# Patient Record
Sex: FEMALE | Race: WHITE | NOT HISPANIC OR LATINO | Employment: FULL TIME | URBAN - METROPOLITAN AREA
[De-identification: names, ages, dates, MRNs, and addresses within clinical notes are randomized per-mention and may not be internally consistent; named-entity substitution may affect disease eponyms.]

---
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
## 2021-05-16 VITALS — WEIGHT: 131 LBS | HEART RATE: 86 BPM | OXYGEN SATURATION: 99 % | BODY MASS INDEX: 19.4 KG/M2 | HEIGHT: 69 IN

## 2021-05-16 VITALS — OXYGEN SATURATION: 99 % | BODY MASS INDEX: 19.4 KG/M2 | WEIGHT: 131 LBS | HEART RATE: 94 BPM | HEIGHT: 69 IN

## 2021-05-16 VITALS — BODY MASS INDEX: 18.51 KG/M2 | OXYGEN SATURATION: 100 % | HEIGHT: 69 IN | HEART RATE: 67 BPM | WEIGHT: 125 LBS

## 2021-05-16 VITALS — HEIGHT: 69 IN | BODY MASS INDEX: 18.51 KG/M2 | OXYGEN SATURATION: 99 % | WEIGHT: 125 LBS | HEART RATE: 67 BPM

## 2021-11-30 PROCEDURE — 87510 GARDNER VAG DNA DIR PROBE: CPT | Performed by: PHYSICIAN ASSISTANT

## 2021-11-30 PROCEDURE — 87660 TRICHOMONAS VAGIN DIR PROBE: CPT | Performed by: PHYSICIAN ASSISTANT

## 2021-11-30 PROCEDURE — 87491 CHLMYD TRACH DNA AMP PROBE: CPT | Performed by: PHYSICIAN ASSISTANT

## 2021-11-30 PROCEDURE — 87591 N.GONORRHOEAE DNA AMP PROB: CPT | Performed by: PHYSICIAN ASSISTANT

## 2021-11-30 PROCEDURE — 87480 CANDIDA DNA DIR PROBE: CPT | Performed by: PHYSICIAN ASSISTANT

## 2021-12-01 ENCOUNTER — TELEPHONE (OUTPATIENT)
Dept: URGENT CARE | Facility: CLINIC | Age: 21
End: 2021-12-01

## 2022-05-02 RX ORDER — LEVONORGESTREL AND ETHINYL ESTRADIOL 6-5-10
KIT ORAL
Qty: 84 TABLET | Refills: 0 | Status: SHIPPED | OUTPATIENT
Start: 2022-05-02

## 2022-11-07 RX ORDER — LEVONORGESTREL AND ETHINYL ESTRADIOL 6-5-10
KIT ORAL
Qty: 84 TABLET | Refills: 0 | OUTPATIENT
Start: 2022-11-07

## 2024-02-15 ENCOUNTER — LAB (OUTPATIENT)
Dept: LAB | Facility: HOSPITAL | Age: 24
End: 2024-02-15
Payer: COMMERCIAL

## 2024-02-15 ENCOUNTER — OFFICE VISIT (OUTPATIENT)
Dept: FAMILY MEDICINE CLINIC | Facility: CLINIC | Age: 24
End: 2024-02-15
Payer: COMMERCIAL

## 2024-02-15 VITALS
SYSTOLIC BLOOD PRESSURE: 125 MMHG | WEIGHT: 141.6 LBS | OXYGEN SATURATION: 100 % | BODY MASS INDEX: 20.97 KG/M2 | DIASTOLIC BLOOD PRESSURE: 80 MMHG | HEIGHT: 69 IN | HEART RATE: 95 BPM | TEMPERATURE: 98.5 F

## 2024-02-15 DIAGNOSIS — R53.83 FATIGUE, UNSPECIFIED TYPE: ICD-10-CM

## 2024-02-15 DIAGNOSIS — F32.A ANXIETY AND DEPRESSION: ICD-10-CM

## 2024-02-15 DIAGNOSIS — F41.9 ANXIETY AND DEPRESSION: ICD-10-CM

## 2024-02-15 DIAGNOSIS — R53.83 FATIGUE, UNSPECIFIED TYPE: Primary | ICD-10-CM

## 2024-02-15 DIAGNOSIS — G47.00 INSOMNIA, UNSPECIFIED TYPE: ICD-10-CM

## 2024-02-15 LAB
ALBUMIN SERPL-MCNC: 5.4 G/DL (ref 3.5–5.2)
ALBUMIN/GLOB SERPL: 2.3 G/DL
ALP SERPL-CCNC: 44 U/L (ref 39–117)
ALT SERPL W P-5'-P-CCNC: 6 U/L (ref 1–33)
ANION GAP SERPL CALCULATED.3IONS-SCNC: 11 MMOL/L (ref 5–15)
AST SERPL-CCNC: 10 U/L (ref 1–32)
BASOPHILS # BLD AUTO: 0.02 10*3/MM3 (ref 0–0.2)
BASOPHILS NFR BLD AUTO: 0.5 % (ref 0–1.5)
BILIRUB SERPL-MCNC: 0.3 MG/DL (ref 0–1.2)
BUN SERPL-MCNC: 14 MG/DL (ref 6–20)
BUN/CREAT SERPL: 16.3 (ref 7–25)
CALCIUM SPEC-SCNC: 10.4 MG/DL (ref 8.6–10.5)
CHLORIDE SERPL-SCNC: 106 MMOL/L (ref 98–107)
CO2 SERPL-SCNC: 24 MMOL/L (ref 22–29)
CREAT SERPL-MCNC: 0.86 MG/DL (ref 0.57–1)
DEPRECATED RDW RBC AUTO: 38.2 FL (ref 37–54)
EGFRCR SERPLBLD CKD-EPI 2021: 97.5 ML/MIN/1.73
EOSINOPHIL # BLD AUTO: 0.06 10*3/MM3 (ref 0–0.4)
EOSINOPHIL NFR BLD AUTO: 1.4 % (ref 0.3–6.2)
ERYTHROCYTE [DISTWIDTH] IN BLOOD BY AUTOMATED COUNT: 12.2 % (ref 12.3–15.4)
FERRITIN SERPL-MCNC: 39.3 NG/ML (ref 13–150)
FOLATE SERPL-MCNC: 14.4 NG/ML (ref 4.78–24.2)
GLOBULIN UR ELPH-MCNC: 2.3 GM/DL
GLUCOSE SERPL-MCNC: 82 MG/DL (ref 65–99)
HCT VFR BLD AUTO: 36.4 % (ref 34–46.6)
HGB BLD-MCNC: 12.2 G/DL (ref 12–15.9)
IMM GRANULOCYTES # BLD AUTO: 0.01 10*3/MM3 (ref 0–0.05)
IMM GRANULOCYTES NFR BLD AUTO: 0.2 % (ref 0–0.5)
IRON 24H UR-MRATE: 76 MCG/DL (ref 37–145)
IRON SATN MFR SERPL: 16 % (ref 20–50)
LYMPHOCYTES # BLD AUTO: 1.03 10*3/MM3 (ref 0.7–3.1)
LYMPHOCYTES NFR BLD AUTO: 23.9 % (ref 19.6–45.3)
MCH RBC QN AUTO: 29.3 PG (ref 26.6–33)
MCHC RBC AUTO-ENTMCNC: 33.5 G/DL (ref 31.5–35.7)
MCV RBC AUTO: 87.3 FL (ref 79–97)
MONOCYTES # BLD AUTO: 0.45 10*3/MM3 (ref 0.1–0.9)
MONOCYTES NFR BLD AUTO: 10.4 % (ref 5–12)
NEUTROPHILS NFR BLD AUTO: 2.74 10*3/MM3 (ref 1.7–7)
NEUTROPHILS NFR BLD AUTO: 63.6 % (ref 42.7–76)
NRBC BLD AUTO-RTO: 0 /100 WBC (ref 0–0.2)
PLATELET # BLD AUTO: 234 10*3/MM3 (ref 140–450)
PMV BLD AUTO: 10.2 FL (ref 6–12)
POTASSIUM SERPL-SCNC: 4.5 MMOL/L (ref 3.5–5.2)
PROT SERPL-MCNC: 7.7 G/DL (ref 6–8.5)
RBC # BLD AUTO: 4.17 10*6/MM3 (ref 3.77–5.28)
SODIUM SERPL-SCNC: 141 MMOL/L (ref 136–145)
T4 FREE SERPL-MCNC: 1.18 NG/DL (ref 0.93–1.7)
TIBC SERPL-MCNC: 466 MCG/DL (ref 298–536)
TRANSFERRIN SERPL-MCNC: 313 MG/DL (ref 200–360)
TSH SERPL DL<=0.05 MIU/L-ACNC: 3.22 UIU/ML (ref 0.27–4.2)
VIT B12 BLD-MCNC: 1359 PG/ML (ref 211–946)
WBC NRBC COR # BLD AUTO: 4.31 10*3/MM3 (ref 3.4–10.8)

## 2024-02-15 PROCEDURE — 86644 CMV ANTIBODY: CPT

## 2024-02-15 PROCEDURE — 86665 EPSTEIN-BARR CAPSID VCA: CPT

## 2024-02-15 PROCEDURE — 84439 ASSAY OF FREE THYROXINE: CPT

## 2024-02-15 PROCEDURE — 84466 ASSAY OF TRANSFERRIN: CPT

## 2024-02-15 PROCEDURE — 82607 VITAMIN B-12: CPT

## 2024-02-15 PROCEDURE — 99204 OFFICE O/P NEW MOD 45 MIN: CPT | Performed by: NURSE PRACTITIONER

## 2024-02-15 PROCEDURE — 36415 COLL VENOUS BLD VENIPUNCTURE: CPT

## 2024-02-15 PROCEDURE — 83540 ASSAY OF IRON: CPT

## 2024-02-15 PROCEDURE — 82728 ASSAY OF FERRITIN: CPT

## 2024-02-15 PROCEDURE — 80050 GENERAL HEALTH PANEL: CPT

## 2024-02-15 PROCEDURE — 86664 EPSTEIN-BARR NUCLEAR ANTIGEN: CPT

## 2024-02-15 PROCEDURE — 82746 ASSAY OF FOLIC ACID SERUM: CPT

## 2024-02-15 PROCEDURE — 86645 CMV ANTIBODY IGM: CPT

## 2024-02-15 RX ORDER — CHOLECALCIFEROL (VITAMIN D3) 125 MCG
500 CAPSULE ORAL DAILY
COMMUNITY

## 2024-02-15 RX ORDER — HYDROXYZINE HYDROCHLORIDE 25 MG/1
25 TABLET, FILM COATED ORAL NIGHTLY PRN
Qty: 30 TABLET | Refills: 1 | Status: SHIPPED | OUTPATIENT
Start: 2024-02-15

## 2024-02-15 RX ORDER — ESCITALOPRAM OXALATE 10 MG/1
10 TABLET ORAL DAILY
Qty: 30 TABLET | Refills: 1 | Status: SHIPPED | OUTPATIENT
Start: 2024-02-15

## 2024-02-17 LAB
CMV IGG SERPL IA-ACNC: 6.3 U/ML (ref 0–0.59)
CMV IGM SERPL IA-ACNC: <30 AU/ML (ref 0–29.9)

## 2024-02-19 LAB
EBV NA IGG SER IA-ACNC: >600 U/ML (ref 0–17.9)
EBV VCA IGG SER IA-ACNC: 385 U/ML (ref 0–17.9)
EBV VCA IGM SER IA-ACNC: <36 U/ML (ref 0–35.9)
SERVICE CMNT-IMP: ABNORMAL

## 2024-03-05 ENCOUNTER — TELEPHONE (OUTPATIENT)
Dept: FAMILY MEDICINE CLINIC | Facility: CLINIC | Age: 24
End: 2024-03-05
Payer: COMMERCIAL

## 2024-03-05 NOTE — TELEPHONE ENCOUNTER
Caller: Yuridia Nelson    Relationship: Self    Best call back number: [phone]     What is the best time to reach you: ANY    Who are you requesting to speak with (clinical staff, provider,  specific staff member): CLINICAL STAFF    What was the call regarding: PATIENT CALLED STATING THAT SHE IS CURRENTLY ONLY TAKING HER LEXAPRO. SHE IS GOING TO A PARTY THIS WEEKEND AND WANTED TO KNOW IF IT IS OKAY FOR HER TO DRINK ALCOHOL OR IF SHE SHOULD BE AWARE OF ANY SIDE EFFECTS

## 2024-03-06 NOTE — TELEPHONE ENCOUNTER
Left detailed message, OK FOR HUB TO RELAY if pt returns call.     Ok to consume alcohol while on Lexapro, would recommend taking it slow maybe only have 1-2 drinks if its the first time drinking with it.

## 2024-04-11 DIAGNOSIS — F41.9 ANXIETY AND DEPRESSION: ICD-10-CM

## 2024-04-11 DIAGNOSIS — F32.A ANXIETY AND DEPRESSION: ICD-10-CM

## 2024-04-11 RX ORDER — ESCITALOPRAM OXALATE 10 MG/1
10 TABLET ORAL DAILY
Qty: 30 TABLET | Refills: 0 | Status: SHIPPED | OUTPATIENT
Start: 2024-04-11

## 2024-04-12 NOTE — TELEPHONE ENCOUNTER
HUB TO RELAY & SCHEDULE                         LMTCB to schedule appt for further refills.

## 2024-04-16 NOTE — TELEPHONE ENCOUNTER
HUB TO RELAY & SCHEDULE                         2nd attempt to contact, PHONE NUMBER IS NO LONGER IN SERVICE. PATIENT DOES NOT HAVE MY CHART. WILL SEND LETTER.

## 2024-04-17 NOTE — TELEPHONE ENCOUNTER
HUB TO RELAY & SCHEDULE                         3rd attempt to contact, PHONE NUMBER IS NO LONGER IN SERVICE. PATIENT DOES NOT HAVE MY CHART. SEND LETTER on 04/16/2024.

## 2025-02-07 ENCOUNTER — TELEPHONE (OUTPATIENT)
Dept: OBSTETRICS AND GYNECOLOGY | Facility: CLINIC | Age: 25
End: 2025-02-07
Payer: COMMERCIAL

## 2025-02-07 NOTE — TELEPHONE ENCOUNTER
Caller: Yuridia Nelson    Relationship to patient: Self    Best call back number: [phone] (home)       Patient is needing: A CALL BACK- SHE IS A NEW OB PT AND HAD SOME SPOTTING SINCE VAGINAL US DONE 3 DAYS AGO. ULTRASOUND WAS DONE SOME WHERE ELSE. THE SPOTTING IS BROWN IN COLOR

## 2025-02-07 NOTE — TELEPHONE ENCOUNTER
Patient called and advised spotting can be common after a vaginal exam/ultrasound or intercourse. She states it is just brownish at this time. Advised to monitor and if increasing in amount, becoming more red in color, or having cramping to go to the ER for evaluation. Recommended pelvic rest until gone for at least 24-48 hours.

## 2025-02-11 ENCOUNTER — APPOINTMENT (OUTPATIENT)
Dept: ULTRASOUND IMAGING | Facility: HOSPITAL | Age: 25
End: 2025-02-11
Payer: COMMERCIAL

## 2025-02-11 ENCOUNTER — HOSPITAL ENCOUNTER (EMERGENCY)
Facility: HOSPITAL | Age: 25
Discharge: HOME | End: 2025-02-11
Attending: EMERGENCY MEDICINE | Admitting: EMERGENCY MEDICINE
Payer: COMMERCIAL

## 2025-02-11 VITALS
TEMPERATURE: 98.3 F | HEIGHT: 69 IN | SYSTOLIC BLOOD PRESSURE: 111 MMHG | WEIGHT: 154.32 LBS | RESPIRATION RATE: 18 BRPM | OXYGEN SATURATION: 99 % | BODY MASS INDEX: 22.86 KG/M2 | DIASTOLIC BLOOD PRESSURE: 66 MMHG | HEART RATE: 88 BPM

## 2025-02-11 DIAGNOSIS — D64.9 ANEMIA, UNSPECIFIED TYPE: ICD-10-CM

## 2025-02-11 DIAGNOSIS — O03.9 COMPLETE MISCARRIAGE: Primary | ICD-10-CM

## 2025-02-11 LAB
ABO GROUP BLD: NORMAL
BASOPHILS # BLD AUTO: 0.02 10*3/MM3 (ref 0–0.2)
BASOPHILS # BLD AUTO: 0.05 10*3/MM3 (ref 0–0.2)
BASOPHILS NFR BLD AUTO: 0.2 % (ref 0–1.5)
BASOPHILS NFR BLD AUTO: 0.3 % (ref 0–1.5)
DEPRECATED RDW RBC AUTO: 39.5 FL (ref 37–54)
DEPRECATED RDW RBC AUTO: 40.3 FL (ref 37–54)
EOSINOPHIL # BLD AUTO: 0.01 10*3/MM3 (ref 0–0.4)
EOSINOPHIL # BLD AUTO: 0.03 10*3/MM3 (ref 0–0.4)
EOSINOPHIL NFR BLD AUTO: 0.1 % (ref 0.3–6.2)
EOSINOPHIL NFR BLD AUTO: 0.2 % (ref 0.3–6.2)
ERYTHROCYTE [DISTWIDTH] IN BLOOD BY AUTOMATED COUNT: 12.2 % (ref 12.3–15.4)
ERYTHROCYTE [DISTWIDTH] IN BLOOD BY AUTOMATED COUNT: 12.4 % (ref 12.3–15.4)
HCG INTACT+B SERPL-ACNC: 2605 MIU/ML
HCT VFR BLD AUTO: 21.7 % (ref 34–46.6)
HCT VFR BLD AUTO: 31.1 % (ref 34–46.6)
HGB BLD-MCNC: 10.3 G/DL (ref 12–15.9)
HGB BLD-MCNC: 7.2 G/DL (ref 12–15.9)
HOLD SPECIMEN: NORMAL
HOLD SPECIMEN: NORMAL
IMM GRANULOCYTES # BLD AUTO: 0.04 10*3/MM3 (ref 0–0.05)
IMM GRANULOCYTES # BLD AUTO: 0.04 10*3/MM3 (ref 0–0.05)
IMM GRANULOCYTES NFR BLD AUTO: 0.3 % (ref 0–0.5)
IMM GRANULOCYTES NFR BLD AUTO: 0.4 % (ref 0–0.5)
LYMPHOCYTES # BLD AUTO: 0.68 10*3/MM3 (ref 0.7–3.1)
LYMPHOCYTES # BLD AUTO: 0.93 10*3/MM3 (ref 0.7–3.1)
LYMPHOCYTES NFR BLD AUTO: 6 % (ref 19.6–45.3)
LYMPHOCYTES NFR BLD AUTO: 6.9 % (ref 19.6–45.3)
MCH RBC QN AUTO: 29.1 PG (ref 26.6–33)
MCH RBC QN AUTO: 29.7 PG (ref 26.6–33)
MCHC RBC AUTO-ENTMCNC: 33.1 G/DL (ref 31.5–35.7)
MCHC RBC AUTO-ENTMCNC: 33.2 G/DL (ref 31.5–35.7)
MCV RBC AUTO: 87.9 FL (ref 79–97)
MCV RBC AUTO: 89.6 FL (ref 79–97)
MONOCYTES # BLD AUTO: 0.39 10*3/MM3 (ref 0.1–0.9)
MONOCYTES # BLD AUTO: 0.64 10*3/MM3 (ref 0.1–0.9)
MONOCYTES NFR BLD AUTO: 4 % (ref 5–12)
MONOCYTES NFR BLD AUTO: 4.1 % (ref 5–12)
NEUTROPHILS NFR BLD AUTO: 13.85 10*3/MM3 (ref 1.7–7)
NEUTROPHILS NFR BLD AUTO: 8.68 10*3/MM3 (ref 1.7–7)
NEUTROPHILS NFR BLD AUTO: 88.4 % (ref 42.7–76)
NEUTROPHILS NFR BLD AUTO: 89.1 % (ref 42.7–76)
NRBC BLD AUTO-RTO: 0 /100 WBC (ref 0–0.2)
NRBC BLD AUTO-RTO: 0 /100 WBC (ref 0–0.2)
PLATELET # BLD AUTO: 161 10*3/MM3 (ref 140–450)
PLATELET # BLD AUTO: 205 10*3/MM3 (ref 140–450)
PMV BLD AUTO: 10.1 FL (ref 6–12)
PMV BLD AUTO: 9.6 FL (ref 6–12)
RBC # BLD AUTO: 2.47 10*6/MM3 (ref 3.77–5.28)
RBC # BLD AUTO: 3.47 10*6/MM3 (ref 3.77–5.28)
RH BLD: POSITIVE
WBC NRBC COR # BLD AUTO: 15.54 10*3/MM3 (ref 3.4–10.8)
WBC NRBC COR # BLD AUTO: 9.82 10*3/MM3 (ref 3.4–10.8)
WHOLE BLOOD HOLD COAG: NORMAL
WHOLE BLOOD HOLD SPECIMEN: NORMAL

## 2025-02-11 PROCEDURE — 99284 EMERGENCY DEPT VISIT MOD MDM: CPT

## 2025-02-11 PROCEDURE — 25010000002 ONDANSETRON PER 1 MG: Performed by: EMERGENCY MEDICINE

## 2025-02-11 PROCEDURE — 25810000003 LACTATED RINGERS SOLUTION: Performed by: EMERGENCY MEDICINE

## 2025-02-11 PROCEDURE — 25010000002 FENTANYL CITRATE (PF) 50 MCG/ML SOLUTION: Performed by: EMERGENCY MEDICINE

## 2025-02-11 PROCEDURE — 96375 TX/PRO/DX INJ NEW DRUG ADDON: CPT

## 2025-02-11 PROCEDURE — 76817 TRANSVAGINAL US OBSTETRIC: CPT

## 2025-02-11 PROCEDURE — 86900 BLOOD TYPING SEROLOGIC ABO: CPT | Performed by: EMERGENCY MEDICINE

## 2025-02-11 PROCEDURE — 86901 BLOOD TYPING SEROLOGIC RH(D): CPT | Performed by: EMERGENCY MEDICINE

## 2025-02-11 PROCEDURE — 96374 THER/PROPH/DIAG INJ IV PUSH: CPT

## 2025-02-11 PROCEDURE — 84702 CHORIONIC GONADOTROPIN TEST: CPT | Performed by: EMERGENCY MEDICINE

## 2025-02-11 PROCEDURE — 36415 COLL VENOUS BLD VENIPUNCTURE: CPT | Performed by: EMERGENCY MEDICINE

## 2025-02-11 PROCEDURE — 85025 COMPLETE CBC W/AUTO DIFF WBC: CPT | Performed by: EMERGENCY MEDICINE

## 2025-02-11 RX ORDER — HYDROCODONE BITARTRATE AND ACETAMINOPHEN 7.5; 325 MG/1; MG/1
1 TABLET ORAL EVERY 6 HOURS PRN
Qty: 12 TABLET | Refills: 0 | Status: SHIPPED | OUTPATIENT
Start: 2025-02-11

## 2025-02-11 RX ORDER — FENTANYL CITRATE 50 UG/ML
50 INJECTION, SOLUTION INTRAMUSCULAR; INTRAVENOUS ONCE
Status: COMPLETED | OUTPATIENT
Start: 2025-02-11 | End: 2025-02-11

## 2025-02-11 RX ORDER — FERROUS SULFATE 325(65) MG
325 TABLET ORAL
Qty: 15 TABLET | Refills: 0 | Status: SHIPPED | OUTPATIENT
Start: 2025-02-11

## 2025-02-11 RX ORDER — ONDANSETRON 2 MG/ML
4 INJECTION INTRAMUSCULAR; INTRAVENOUS ONCE
Status: COMPLETED | OUTPATIENT
Start: 2025-02-11 | End: 2025-02-11

## 2025-02-11 RX ORDER — MISOPROSTOL 200 UG/1
600 TABLET ORAL ONCE
Status: COMPLETED | OUTPATIENT
Start: 2025-02-11 | End: 2025-02-11

## 2025-02-11 RX ORDER — SODIUM CHLORIDE 0.9 % (FLUSH) 0.9 %
10 SYRINGE (ML) INJECTION AS NEEDED
Status: DISCONTINUED | OUTPATIENT
Start: 2025-02-11 | End: 2025-02-11 | Stop reason: HOSPADM

## 2025-02-11 RX ADMIN — SODIUM CHLORIDE, POTASSIUM CHLORIDE, SODIUM LACTATE AND CALCIUM CHLORIDE 1000 ML: 600; 310; 30; 20 INJECTION, SOLUTION INTRAVENOUS at 12:21

## 2025-02-11 RX ADMIN — FENTANYL CITRATE 50 MCG: 50 INJECTION, SOLUTION INTRAMUSCULAR; INTRAVENOUS at 14:45

## 2025-02-11 RX ADMIN — MISOPROSTOL 600 MCG: 200 TABLET ORAL at 17:13

## 2025-02-11 RX ADMIN — SODIUM CHLORIDE, SODIUM LACTATE, POTASSIUM CHLORIDE, CALCIUM CHLORIDE 1000 ML: 20; 30; 600; 310 INJECTION, SOLUTION INTRAVENOUS at 14:49

## 2025-02-11 RX ADMIN — ONDANSETRON 4 MG: 2 INJECTION INTRAMUSCULAR; INTRAVENOUS at 14:45

## 2025-02-11 NOTE — Clinical Note
[facility] EMERGENCY ROOM  [address]  [place] [address]  Phone: [phone]  Fax: [phone]    Yuridia Nelson was seen and treated in our emergency department on 2/11/2025.  She may return to work on 02/15/2025.         Thank you for choosing [facility].    Nick Mayfield, DO

## 2025-02-11 NOTE — ED NOTES
Per ems, Patient is 8 weeks pregnant, started having heavy bleeding, passed tissue. 25mcg fentanyl given ems, LR also given. Patient was hypotensive. Patient still bleeding.

## 2025-02-11 NOTE — DISCHARGE INSTRUCTIONS
Drink plenty of fluids.  Take medication as directed.  Return for worsening symptoms such as heavy bleeding, high fever, severe pain, persistent vomiting, other severe symptoms.  Follow-up with your OB/GYN this week.

## 2025-02-11 NOTE — ED PROVIDER NOTES
Time: 11:58 AM EST  Date of encounter:  2025  Independent Historian/Clinical History and Information was obtained by:   Patient    History is limited by: N/A    Chief Complaint: Pregnancy and vaginal bleeding      History of Present Illness:  Patient is a 24 y.o. year old female who presents to the emergency department for evaluation of pregnancy and vaginal bleeding.  This patient states he is approximately 8 weeks pregnant and started having vaginal bleeding and pelvic pain which started earlier in the day.  She said no fever chills cough vomiting or diarrhea she did feel somewhat lightheaded.  The patient states that she is  1 para 0 and has been seen at a clinic in [place] and at [facility] here in [place].      Patient Care Team  Primary Care Provider: Provider, No Known    Past Medical History:     No Known Allergies  Past Medical History:   Diagnosis Date    Anxiety     Depression     MRSA (methicillin resistant staph aureus) culture positive      Past Surgical History:   Procedure Laterality Date    LEG ABSCESS INCISION AND DRAINAGE Right     leg- mrsa     History reviewed. No pertinent family history.    Home Medications:  Prior to Admission medications    Medication Sig Start Date End Date Taking? Authorizing Provider   escitalopram (LEXAPRO) 10 MG tablet TAKE 1 TABLET BY MOUTH EVERY DAY 24   Wendy Blanc APRN   hydrOXYzine (ATARAX) 25 MG tablet Take 1 tablet by mouth At Night As Needed (insomnia/anxiety). 2/15/24   Wendy Blanc APRN   vitamin B-12 (CYANOCOBALAMIN) 500 MCG tablet Take 1 tablet by mouth Daily.    Provider, Historical, MD        Social History:   Social History     Tobacco Use    Smoking status: Former     Current packs/day: 1.00     Average packs/day: 1 pack/day for 2.0 years (2.0 ttl pk-yrs)     Types: Cigarettes    Smokeless tobacco: Never   Vaping Use    Vaping status: Every Day    Substances: Nicotine   Substance Use Topics     "Alcohol use: Yes     Comment: social    Drug use: Never         Review of Systems:  Review of Systems   Constitutional:  Negative for chills and fever.   HENT:  Negative for congestion, ear pain and sore throat.    Eyes:  Negative for pain.   Respiratory:  Negative for cough, chest tightness and shortness of breath.    Cardiovascular:  Negative for chest pain.   Gastrointestinal:  Negative for abdominal pain, diarrhea, nausea and vomiting.   Genitourinary:  Positive for pelvic pain and vaginal bleeding. Negative for flank pain and hematuria.   Musculoskeletal:  Negative for joint swelling.   Skin:  Negative for pallor.   Neurological:  Negative for seizures and headaches.   All other systems reviewed and are negative.       Physical Exam:  /66 (BP Location: Left arm, Patient Position: Sitting)   Pulse 88   Temp 98.3 °F (36.8 °C) (Oral)   Resp 18   Ht 175.3 cm (69\")   Wt 70 kg (154 lb 5.2 oz)   SpO2 99%   BMI 22.79 kg/m²     Physical Exam  Vitals and nursing note reviewed.   Constitutional:       General: She is not in acute distress.     Appearance: Normal appearance. She is not toxic-appearing.   HENT:      Head: Normocephalic and atraumatic.      Mouth/Throat:      Mouth: Mucous membranes are moist.   Eyes:      General: No scleral icterus.  Cardiovascular:      Rate and Rhythm: Normal rate and regular rhythm.      Pulses: Normal pulses.      Heart sounds: Normal heart sounds.   Pulmonary:      Effort: Pulmonary effort is normal. No respiratory distress.      Breath sounds: Normal breath sounds.   Abdominal:      General: Abdomen is flat.      Palpations: Abdomen is soft.      Tenderness: There is no abdominal tenderness.   Genitourinary:     Comments: Vaginal bleeding with open cervix at 4 cm and bulging membrane  Musculoskeletal:         General: Normal range of motion.      Cervical back: Normal range of motion and neck supple.   Skin:     General: Skin is warm and dry.   Neurological:      Mental " Status: She is alert and oriented to person, place, and time. Mental status is at baseline.                    Medical Decision Making:      Comorbidities that affect care:    Pregnancy    External Notes reviewed:    Previous Clinic Note: office visit for fatigue      The following orders were placed and all results were independently analyzed by me:  Orders Placed This Encounter   Procedures    US Ob Transvaginal    Elmo Draw    hCG, Quantitative, Pregnancy    CBC Auto Differential    CBC Auto Differential    Undress & Gown    Vital Signs    Supplies To Bedside - Notify MD When Ready- Pelvic Cart / Set Up    OB / GYN (on-call MD unless specified)    ABO / Rh    CBC & Differential    Green Top (Gel)    Lavender Top    Gold Top - SST    Light Blue Top    CBC & Differential       Medications Given in the Emergency Department:  Medications   lactated ringers bolus 1,000 mL (0 mL Intravenous Stopped 2/11/25 1405)   fentaNYL citrate (PF) (SUBLIMAZE) injection 50 mcg (50 mcg Intravenous Given 2/11/25 1445)   lactated ringers bolus 1,000 mL (0 mL Intravenous Stopped 2/11/25 1739)   ondansetron (ZOFRAN) injection 4 mg (4 mg Intravenous Given 2/11/25 1445)   miSOPROStol (CYTOTEC) tablet 600 mcg (600 mcg Rectal Given 2/11/25 1713)        ED Course:         Labs:    Lab Results (last 24 hours)       Procedure Component Value Units Date/Time    CBC & Differential [[id number]]  (Abnormal) Collected: 02/11/25 1213    Specimen: Blood Updated: 02/11/25 1222    Narrative:      The following orders were created for panel order CBC & Differential.  Procedure                               Abnormality         Status                     ---------                               -----------         ------                     CBC Auto Differential[[id number]]        Abnormal            Final result                 Please view results for these tests on the individual orders.    hCG, Quantitative, Pregnancy [[id number]] Collected: 02/11/25  1213    Specimen: Blood Updated: 02/11/25 1239     HCG Quantitative 2,605.00 mIU/mL     Narrative:      HCG Ranges by Gestational Age    Females - non-pregnant premenopausal   </= 1mIU/mL HCG  Females - postmenopausal               </= 7mIU/mL HCG    3 Weeks       5.4   -      72 mIU/mL  4 Weeks      10.2   -     708 mIU/mL  5 Weeks       217   -   8,245 mIU/mL  6 Weeks       152   -  32,177 mIU/mL  7 Weeks     4,059   - 153,767 mIU/mL  8 Weeks    31,366   - 149,094 mIU/mL  9 Weeks    59,109   - 135,901 mIU/mL  10 Weeks   44,186   - 170,409 mIU/mL  12 Weeks   27,107   - 201,615 mIU/mL  14 Weeks   24,302   -  93,646 mIU/mL  15 Weeks   12,540   -  69,747 mIU/mL  16 Weeks    8,904   -  55,332 mIU/mL  17 Weeks    8,240   -  51,793 mIU/mL  18 Weeks    9,649   -  55,271 mIU/mL      CBC Auto Differential [[id number]]  (Abnormal) Collected: 02/11/25 1213    Specimen: Blood Updated: 02/11/25 1222     WBC 15.54 10*3/mm3      RBC 3.47 10*6/mm3      Hemoglobin 10.3 g/dL      Hematocrit 31.1 %      MCV 89.6 fL      MCH 29.7 pg      MCHC 33.1 g/dL      RDW 12.4 %      RDW-SD 40.3 fl      MPV 10.1 fL      Platelets 205 10*3/mm3      Neutrophil % 89.1 %      Lymphocyte % 6.0 %      Monocyte % 4.1 %      Eosinophil % 0.2 %      Basophil % 0.3 %      Immature Grans % 0.3 %      Neutrophils, Absolute 13.85 10*3/mm3      Lymphocytes, Absolute 0.93 10*3/mm3      Monocytes, Absolute 0.64 10*3/mm3      Eosinophils, Absolute 0.03 10*3/mm3      Basophils, Absolute 0.05 10*3/mm3      Immature Grans, Absolute 0.04 10*3/mm3      nRBC 0.0 /100 WBC     CBC & Differential [[id number]]  (Abnormal) Collected: 02/11/25 1649    Specimen: Blood from Arm, Left Updated: 02/11/25 1713    Narrative:      The following orders were created for panel order CBC & Differential.  Procedure                               Abnormality         Status                     ---------                               -----------         ------                     CBC Auto  Differential[[id number]]        Abnormal            Final result                 Please view results for these tests on the individual orders.    CBC Auto Differential [[id number]]  (Abnormal) Collected: 02/11/25 1649    Specimen: Blood from Arm, Left Updated: 02/11/25 1713     WBC 9.82 10*3/mm3      RBC 2.47 10*6/mm3      Hemoglobin 7.2 g/dL      Hematocrit 21.7 %      MCV 87.9 fL      MCH 29.1 pg      MCHC 33.2 g/dL      RDW 12.2 %      RDW-SD 39.5 fl      MPV 9.6 fL      Platelets 161 10*3/mm3      Neutrophil % 88.4 %      Lymphocyte % 6.9 %      Monocyte % 4.0 %      Eosinophil % 0.1 %      Basophil % 0.2 %      Immature Grans % 0.4 %      Neutrophils, Absolute 8.68 10*3/mm3      Lymphocytes, Absolute 0.68 10*3/mm3      Monocytes, Absolute 0.39 10*3/mm3      Eosinophils, Absolute 0.01 10*3/mm3      Basophils, Absolute 0.02 10*3/mm3      Immature Grans, Absolute 0.04 10*3/mm3      nRBC 0.0 /100 WBC              Imaging:    US Ob Transvaginal    Result Date: 2/11/2025  US OB TRANSVAGINAL Date of Exam: 2/11/2025 12:57 PM EST Indication: First pregnancy and vaginal bleeding. Comparison: CT abdomen and pelvis dated 10/8/2019 Technique: Transvaginal ultrasound was performed to evaluate pregnancy.  Real time scanning was performed with multiple image documentation per protocol.  Findings: The uterus is present and anteverted measuring 9.6 x 5.1 x 5.3 cm. The endometrial thickness appears normal measuring 4 mm. There is no intrauterine pregnancy. There is nonspecific soft tissue at the cervix which could represent active passage of failed products of conception. Cervical mass is felt less likely but not excluded given the clinical scenario. The right ovary measures 3.0 x 1.3 x 2.4 cm. The left ovary measures 3.2 x 3.9 x 1.5 cm. There is a probable corpus luteum cyst at the left ovary measuring 1.4 x 1.5 x 1.0 cm. There is an anechoic central portion. There is no free fluid within the pelvis.     Impression: 1. No evidence  of normal intrauterine pregnancy. Sonographer reports soft tissue and blood products at the cervix which could represent active passage of a failed pregnancy. Correlate clinically with physical examination and follow-up with beta hCG. OB consultation may be helpful. 2. Probable left-sided corpus luteum cyst. Electronically Signed: Jose Armando Fosterelor  2/11/2025 2:03 PM EST  Workstation ID: [id number]       Differential Diagnosis and Discussion:    Vaginal Bleeding: Differential diagnosis includes but is not limited to foreign body, tumor, vaginitis, dysfunctional uterine bleeding, endocrine abnormalities, coagulation disorder, systemic illness, polyps, complications of pregnancy (possible ectopic pregnancy).    PROCEDURES:    Labs were collected in the emergency department and all labs were reviewed and interpreted by me.  X-ray were performed in the emergency department and all X-ray impressions were independently interpreted by me.    No orders to display       Procedures    MDM     Amount and/or Complexity of Data Reviewed  Clinical lab tests: reviewed  Tests in the radiology section of CPT®: reviewed  Decide to obtain previous medical records or to obtain history from someone other than the patient: yes                       Patient Care Considerations:    CT ABDOMEN AND PELVIS: I considered ordering a CT scan of the abdomen and pelvis however US is better for pregnancy evaluation      Consultants/Shared Management Plan:    Consultant: I have discussed the case with DR Stokes who states he saw patient in the eD    Social Determinants of Health:    Patient has presented with family members who are responsible, reliable and will ensure follow up care.      Disposition and Care Coordination:    Discharged: I considered escalation of care by admitting this patient to the hospital, however The patient was seen and evaluated by Dr Stokes who removed blood and POC and patient is now no longer bleeding.  Drop in Hgb  discussed and the patient will be discharged home  on iron    I have explained discharge medications and the need for follow up with the patient/caretakers. This was also printed in the discharge instructions. Patient was discharged with the following medications and follow up:      Medication List        New Prescriptions      ferrous sulfate 325 (65 FE) MG tablet  Take 1 tablet by mouth Daily With Breakfast.     HYDROcodone-acetaminophen 7.5-325 MG per tablet  Commonly known as: NORCO  Take 1 tablet by mouth Every 6 (Six) Hours As Needed (Pain).               Where to Get Your Medications        These medications were sent to [facility], [place] - [place], [place] - [address] - [phone]  - [phone]   [address], [facility] [address]      Phone: [phone]   ferrous sulfate 325 (65 FE) MG tablet  HYDROcodone-acetaminophen 7.5-325 MG per tablet      Antolin Morris MD  [facility] [place] [address] [phone]    In 1 day  Call for appointment       Final diagnoses:   Complete miscarriage   Anemia, unspecified type        ED Disposition       ED Disposition   Discharge    Condition   Stable    Comment   --               This medical record created using voice recognition software.             Nick Mayfield,   02/11/25 6832

## 2025-02-11 NOTE — CONSULTS
GYN HISTORY & PHYSICAL      Patient Name: Yuridia Nelson  : 2000  MRN: [id number]    Subjective     Chief Complaint:   Chief Complaint   Patient presents with    Vaginal Bleeding - Pregnant       HPI:  24 y.o.  No LMP recorded. Patient is pregnant.  Who presented emergency department with complaint of heavy vaginal bleeding.  Patient reports that she started having some brown discharge yesterday.  Heavy bleeding started at 9:00 this morning.  Patient was diagnosed with a pregnancy in [place].  She had an ultrasound there and was told that she was 6 weeks along.  She is unaware of what was seen on the ultrasound.  She is unaware of she had a quantitative hCG done.  She was supposed to follow-up 2 weeks after the initial evaluation but has moved to this area.  She had an ultrasound at [facility] but again was not told what they saw.  She reports cramping in addition to the bleeding.  Dr. Mayfield did initial exam and saw some membranes at the cervical os.  Ultrasound was done here did not show any evidence of intrauterine pregnancy but there was some blood and tissue in the cervical os.  I was asked to evaluate patient.  She denies any fever or chills.  Social History     Substance and Sexual Activity   Sexual Activity Defer           ROS:     Personal History     PMHx:    Past Medical History:   Diagnosis Date    Anxiety     Depression     MRSA (methicillin resistant staph aureus) culture positive        OBHx:   OB History    Para Term  AB Living   1             SAB IAB Ectopic Molar Multiple Live Births                    # Outcome Date GA Lbr Mitchel/2nd Weight Sex Type Anes PTL Lv   1 Current                 Medications:  escitalopram, hydrOXYzine, and vitamin B-12    Allergies:  No Known Allergies    PSHx:   Past Surgical History:   Procedure Laterality Date    LEG ABSCESS INCISION AND DRAINAGE Right     leg- mrsa       Social History:    reports that she has quit smoking. Her smoking  use included cigarettes. She has a 2 pack-year smoking history. She has never used smokeless tobacco. She reports current alcohol use. She reports that she does not use drugs.    Family History:   No family history on file.    Immunizations: Non contributory to this admission        Objective     Vitals:   Temp:  [98.2 °F (36.8 °C)-98.5 °F (36.9 °C)] 98.5 °F (36.9 °C)  Heart Rate:  [62-81] 81  Resp:  [18] 18  BP: (89-96)/(52-67) 89/52    PHYSICAL EXAM:   General- NAD, alert and oriented, appropriate  Psych- Normal mood, good memory  Cardiovascular- Regular rhythm, no murnurs  Respiratory- CTA to bases, no wheezes  Abdomen- NABS, soft, non distended, non tender, no masses  Pelvic- Normal external female genitalia  Bladder: Non distended, non tender, no prolapse  Vulva/Vagina: Without lesion and without discharge  Sterile speculum exam: Large amount of blood and clots in the vaginal vault.  After clearing the blood and the clots there was tissue and membranes noted at the cervical os.  Using ring forcep the tissue was removed and the bleeding stopped.        Lab/Imaging/Other: Labs and ultrasound reviewed      Assessment / Plan     Assessment:  Incomplete miscarriage.  Bleeding subsided significantly after removal of the residual pregnancy tissue in the cervical os.    Plan:   Pain medication  Cytotec 600 mcg rectally  Patient is a positive so she does not need RhoGAM  Needs to schedule an appointment with OB/GYN for follow-up  No follow-ups on file.          Signature:     [facility] EMERGENCY ROOM  [address]  [place] [address]  Dept: [phone]  Dept Fax: [phone]  Loc: [phone]

## 2025-02-14 ENCOUNTER — TELEPHONE (OUTPATIENT)
Dept: OBSTETRICS AND GYNECOLOGY | Facility: CLINIC | Age: 25
End: 2025-02-14
Payer: COMMERCIAL

## 2025-02-14 NOTE — TELEPHONE ENCOUNTER
Patient called back and states she was seen at the ER for miscarriage. She states she is still bleeding but is only half filling a daytime pad daily. She is also still having cramping and it is period-like but is improved from when she went to the ER. She has been taking the prescribed pain medication or ibuprofen/tylenol from the ER and it helps some with the cramping. She teddy be considered a new patient at this time and was last seen in 2021. Please advise on follow up appointment.

## 2025-02-14 NOTE — TELEPHONE ENCOUNTER
Provider: Antolin Morris MD     Caller: Yuridia Nelson  Female, 24 y.o., 2000  MRN: [id number]  CSN: [id number]  Phone: [phone]    Relationship to Patient: SELF            Reason for Call: PT CALLED AND CANCELLED NEW OB APPT ON 3-12-25, PT DIRECTED TO MAKE FOLLOW UP APPT FROM [facility] ON 2-11-25, PT REQ A CALL BACK TO [facility] APPT

## 2025-02-23 DIAGNOSIS — F32.A ANXIETY AND DEPRESSION: ICD-10-CM

## 2025-02-23 DIAGNOSIS — F41.9 ANXIETY AND DEPRESSION: ICD-10-CM

## 2025-02-24 RX ORDER — ESCITALOPRAM OXALATE 10 MG/1
10 TABLET ORAL DAILY
Qty: 30 TABLET | Refills: 0 | OUTPATIENT
Start: 2025-02-24

## 2025-03-27 ENCOUNTER — TELEPHONE (OUTPATIENT)
Dept: FAMILY MEDICINE CLINIC | Facility: CLINIC | Age: 25
End: 2025-03-27

## 2025-03-27 NOTE — TELEPHONE ENCOUNTER
Caller: Yuridia Nelson    Relationship: Self    Best call back number: [phone]    Which medication are you concerned about:   escitalopram (LEXAPRO) 10 MG tablet     Who prescribed you this medication: NIKI ELLER    What are your concerns: PATIENT WOULD LIKE TO HAVE INCREASE OF THE escitalopram (LEXAPRO) 10 MG tablet AND SEND TO [facility] IN [place]. HER PRIMARY CARE PROVIDER IS DAPHNIE.

## 2025-03-28 NOTE — TELEPHONE ENCOUNTER
Pt will need an appt. Pt has not been seen since Feb 2024 and per chart pt has been out of lexapro since May 2024. We can not increase or refill at this time without an appt.

## 2025-03-31 ENCOUNTER — LAB (OUTPATIENT)
Dept: LAB | Facility: HOSPITAL | Age: 25
End: 2025-03-31
Payer: COMMERCIAL

## 2025-03-31 ENCOUNTER — OFFICE VISIT (OUTPATIENT)
Dept: FAMILY MEDICINE CLINIC | Facility: CLINIC | Age: 25
End: 2025-03-31
Payer: COMMERCIAL

## 2025-03-31 VITALS
WEIGHT: 153 LBS | BODY MASS INDEX: 22.66 KG/M2 | OXYGEN SATURATION: 100 % | HEIGHT: 69 IN | DIASTOLIC BLOOD PRESSURE: 78 MMHG | SYSTOLIC BLOOD PRESSURE: 131 MMHG | HEART RATE: 93 BPM

## 2025-03-31 DIAGNOSIS — D64.9 ANEMIA, UNSPECIFIED TYPE: ICD-10-CM

## 2025-03-31 DIAGNOSIS — R19.7 DIARRHEA, UNSPECIFIED TYPE: ICD-10-CM

## 2025-03-31 DIAGNOSIS — F32.A ANXIETY AND DEPRESSION: ICD-10-CM

## 2025-03-31 DIAGNOSIS — F41.9 ANXIETY AND DEPRESSION: ICD-10-CM

## 2025-03-31 DIAGNOSIS — R19.7 DIARRHEA, UNSPECIFIED TYPE: Primary | ICD-10-CM

## 2025-03-31 LAB
027 TOXIN: NORMAL
ALBUMIN SERPL-MCNC: 4.5 G/DL (ref 3.5–5.2)
ALBUMIN/GLOB SERPL: 1.9 G/DL
ALP SERPL-CCNC: 49 U/L (ref 39–117)
ALT SERPL W P-5'-P-CCNC: 8 U/L (ref 1–33)
AMYLASE SERPL-CCNC: 52 U/L (ref 28–100)
ANION GAP SERPL CALCULATED.3IONS-SCNC: 10.1 MMOL/L (ref 5–15)
AST SERPL-CCNC: 16 U/L (ref 1–32)
BASOPHILS # BLD AUTO: 0.03 10*3/MM3 (ref 0–0.2)
BASOPHILS NFR BLD AUTO: 0.8 % (ref 0–1.5)
BILIRUB SERPL-MCNC: 0.2 MG/DL (ref 0–1.2)
BUN SERPL-MCNC: 14 MG/DL (ref 6–20)
BUN/CREAT SERPL: 21.2 (ref 7–25)
C DIFF TOX GENS STL QL NAA+PROBE: NEGATIVE
CALCIUM SPEC-SCNC: 9.5 MG/DL (ref 8.6–10.5)
CHLORIDE SERPL-SCNC: 104 MMOL/L (ref 98–107)
CO2 SERPL-SCNC: 23.9 MMOL/L (ref 22–29)
CREAT SERPL-MCNC: 0.66 MG/DL (ref 0.57–1)
DEPRECATED RDW RBC AUTO: 43.6 FL (ref 37–54)
EGFRCR SERPLBLD CKD-EPI 2021: 125.8 ML/MIN/1.73
EOSINOPHIL # BLD AUTO: 0.1 10*3/MM3 (ref 0–0.4)
EOSINOPHIL NFR BLD AUTO: 2.8 % (ref 0.3–6.2)
ERYTHROCYTE [DISTWIDTH] IN BLOOD BY AUTOMATED COUNT: 14.2 % (ref 12.3–15.4)
GLOBULIN UR ELPH-MCNC: 2.4 GM/DL
GLUCOSE SERPL-MCNC: 83 MG/DL (ref 65–99)
HCT VFR BLD AUTO: 27.3 % (ref 34–46.6)
HGB BLD-MCNC: 8.3 G/DL (ref 12–15.9)
IMM GRANULOCYTES # BLD AUTO: 0.01 10*3/MM3 (ref 0–0.05)
IMM GRANULOCYTES NFR BLD AUTO: 0.3 % (ref 0–0.5)
LIPASE SERPL-CCNC: 33 U/L (ref 13–60)
LYMPHOCYTES # BLD AUTO: 1.09 10*3/MM3 (ref 0.7–3.1)
LYMPHOCYTES NFR BLD AUTO: 30.1 % (ref 19.6–45.3)
MCH RBC QN AUTO: 25.5 PG (ref 26.6–33)
MCHC RBC AUTO-ENTMCNC: 30.4 G/DL (ref 31.5–35.7)
MCV RBC AUTO: 84 FL (ref 79–97)
MONOCYTES # BLD AUTO: 0.42 10*3/MM3 (ref 0.1–0.9)
MONOCYTES NFR BLD AUTO: 11.6 % (ref 5–12)
NEUTROPHILS NFR BLD AUTO: 1.97 10*3/MM3 (ref 1.7–7)
NEUTROPHILS NFR BLD AUTO: 54.4 % (ref 42.7–76)
NRBC BLD AUTO-RTO: 0 /100 WBC (ref 0–0.2)
PLATELET # BLD AUTO: 259 10*3/MM3 (ref 140–450)
PMV BLD AUTO: 10.9 FL (ref 6–12)
POTASSIUM SERPL-SCNC: 4 MMOL/L (ref 3.5–5.2)
PROT SERPL-MCNC: 6.9 G/DL (ref 6–8.5)
RBC # BLD AUTO: 3.25 10*6/MM3 (ref 3.77–5.28)
SODIUM SERPL-SCNC: 138 MMOL/L (ref 136–145)
T4 FREE SERPL-MCNC: 1.04 NG/DL (ref 0.92–1.68)
TSH SERPL DL<=0.05 MIU/L-ACNC: 2.09 UIU/ML (ref 0.27–4.2)
WBC NRBC COR # BLD AUTO: 3.62 10*3/MM3 (ref 3.4–10.8)

## 2025-03-31 PROCEDURE — 99214 OFFICE O/P EST MOD 30 MIN: CPT | Performed by: NURSE PRACTITIONER

## 2025-03-31 PROCEDURE — 80050 GENERAL HEALTH PANEL: CPT

## 2025-03-31 PROCEDURE — 36415 COLL VENOUS BLD VENIPUNCTURE: CPT

## 2025-03-31 PROCEDURE — 83540 ASSAY OF IRON: CPT

## 2025-03-31 PROCEDURE — 87493 C DIFF AMPLIFIED PROBE: CPT

## 2025-03-31 PROCEDURE — 82728 ASSAY OF FERRITIN: CPT

## 2025-03-31 PROCEDURE — 87506 IADNA-DNA/RNA PROBE TQ 6-11: CPT

## 2025-03-31 PROCEDURE — 84466 ASSAY OF TRANSFERRIN: CPT

## 2025-03-31 PROCEDURE — 83690 ASSAY OF LIPASE: CPT

## 2025-03-31 PROCEDURE — 82150 ASSAY OF AMYLASE: CPT

## 2025-03-31 PROCEDURE — 84439 ASSAY OF FREE THYROXINE: CPT

## 2025-03-31 RX ORDER — ESCITALOPRAM OXALATE 20 MG/1
20 TABLET ORAL DAILY
Qty: 30 TABLET | Refills: 1 | Status: SHIPPED | OUTPATIENT
Start: 2025-03-31

## 2025-03-31 NOTE — ASSESSMENT & PLAN NOTE
Patient had previously discontinued Lexapro on her own, states that she restarted it 2 or 3 months ago and had been doing much better as far as her depression, but anxiety persists, also potentially having diarrhea as a side effect of the medication, although she does not remember having this issue with it when she took it previously.  We are doing workup to evaluate the diarrhea, but patient requesting to increase dose, we will go ahead and increase to 20 mg, she will monitor for any worsening of the diarrhea, otherwise we will follow back up in 6 weeks for reevaluation

## 2025-03-31 NOTE — PROGRESS NOTES
"Chief Complaint  Anxiety and Depression    SUBJECTIVE  Yuridia Nelson presents to [facility] to follow up on Anxiety and Depression. Pt previously prescribed Lexapro 10 mg and had stopped for a while but started back 3 months ago. Pt would like to discuss increasing the dose.     Pt states that her depression is actually better, but anxiety persists      Pt states has been having diarrhea since restarting the lexapro, states this alternates with normal stool, but still has diarrhea almost every day.  Patient notes she is loose and sometimes even watery.  States that it tends to be worse in the mornings.  Denies any abdominal pain or nausea or vomiting    Pt states that she did have a miscarriage about 1 & 1/2 months ago     History of Present Illness  Past Medical History:   Diagnosis Date    Anxiety     Depression     MRSA (methicillin resistant staph aureus) culture positive       History reviewed. No pertinent family history.   Past Surgical History:   Procedure Laterality Date    LEG ABSCESS INCISION AND DRAINAGE Right     leg- mrsa        Current Outpatient Medications:     escitalopram (LEXAPRO) 20 MG tablet, Take 1 tablet by mouth Daily., Disp: 30 tablet, Rfl: 1    OBJECTIVE  Vital Signs:   /78   Pulse 93   Ht 175.3 cm (69\")   Wt 69.4 kg (153 lb)   SpO2 100%   Breastfeeding No   BMI 22.59 kg/m²    Estimated body mass index is 22.59 kg/m² as calculated from the following:    Height as of this encounter: 175.3 cm (69\").    Weight as of this encounter: 69.4 kg (153 lb).     Wt Readings from Last 3 Encounters:   03/31/25 69.4 kg (153 lb)   02/11/25 70 kg (154 lb 5.2 oz)   02/15/24 64.2 kg (141 lb 9.6 oz)     BP Readings from Last 3 Encounters:   03/31/25 131/78   02/11/25 111/66   02/15/24 125/80       Physical Exam  Vitals reviewed.   Constitutional:       Appearance: Normal appearance. She is well-developed.   HENT:      Head: Normocephalic and atraumatic.      " Right Ear: External ear normal.      Left Ear: External ear normal.   Eyes:      Conjunctiva/sclera: Conjunctivae normal.      Pupils: Pupils are equal, round, and reactive to light.   Cardiovascular:      Rate and Rhythm: Normal rate and regular rhythm.      Heart sounds: No murmur heard.     No friction rub. No gallop.   Pulmonary:      Effort: Pulmonary effort is normal.      Breath sounds: Normal breath sounds. No wheezing or rhonchi.   Abdominal:      General: Abdomen is flat. Bowel sounds are normal. There is no distension.      Palpations: Abdomen is soft. There is no mass.      Tenderness: There is no abdominal tenderness.   Skin:     General: Skin is warm and dry.   Neurological:      Mental Status: She is alert and oriented to person, place, and time.      Cranial Nerves: No cranial nerve deficit.   Psychiatric:         Mood and Affect: Mood and affect normal.         Behavior: Behavior normal.         Thought Content: Thought content normal.         Judgment: Judgment normal.          Result Review      CBC          2/11/2025    12:13 2/11/2025    16:49   CBC   WBC 15.54  9.82    RBC 3.47  2.47    Hemoglobin 10.3  7.2    Hematocrit 31.1  21.7    MCV 89.6  87.9    MCH 29.7  29.1    MCHC 33.1  33.2    RDW 12.4  12.2    Platelets 205  161              US Ob Transvaginal  Result Date: 2/11/2025  Impression: 1. No evidence of normal intrauterine pregnancy. Sonographer reports soft tissue and blood products at the cervix which could represent active passage of a failed pregnancy. Correlate clinically with physical examination and follow-up with beta hCG. OB consultation may be helpful. 2. Probable left-sided corpus luteum cyst. Electronically Signed: Jose Armando Moralez  2/11/2025 2:03 PM EST  Workstation ID: [id number]       The above data has been reviewed by SHANTE Taylor 03/31/2025 10:12 EDT.          Patient Care Team:  Wendy Blanc APRN as PCP - General (Nurse Practitioner)    BMI is within  normal parameters. No other follow-up for BMI required.       ASSESSMENT & PLAN    Diagnoses and all orders for this visit:    1. Diarrhea, unspecified type (Primary)  -     TSH+Free T4; Future  -     CBC w AUTO Differential; Future  -     Comprehensive metabolic panel; Future  -     Amylase; Future  -     Lipase; Future  -     Enteric Bacterial Panel - Stool, Per Rectum; Future  -     Enteric Parasite Panel - Stool, Per Rectum; Future  -     Clostridioides difficile Toxin, PCR - Stool, Per Rectum; Future    2. Anxiety and depression  Assessment & Plan:  Patient had previously discontinued Lexapro on her own, states that she restarted it 2 or 3 months ago and had been doing much better as far as her depression, but anxiety persists, also potentially having diarrhea as a side effect of the medication, although she does not remember having this issue with it when she took it previously.  We are doing workup to evaluate the diarrhea, but patient requesting to increase dose, we will go ahead and increase to 20 mg, she will monitor for any worsening of the diarrhea, otherwise we will follow back up in 6 weeks for reevaluation    Orders:  -     escitalopram (LEXAPRO) 20 MG tablet; Take 1 tablet by mouth Daily.  Dispense: 30 tablet; Refill: 1         Tobacco Use: Medium Risk (3/31/2025)    Patient History     Smoking Tobacco Use: Former     Smokeless Tobacco Use: Never     Passive Exposure: Past       Follow Up     Return in about 6 weeks (around 5/12/2025), or if symptoms worsen or fail to improve.        Patient was given instructions and counseling regarding her condition or for health maintenance advice. Please see specific information pulled into the AVS if appropriate.   I have reviewed information obtained and documented by others and I have confirmed the accuracy of this documented note.    SHANTE Taylor

## 2025-04-01 LAB
C COLI+JEJ+UPSA DNA STL QL NAA+NON-PROBE: NOT DETECTED
CRYPTOSP DNA STL QL NAA+NON-PROBE: NOT DETECTED
E HISTOLYT DNA STL QL NAA+NON-PROBE: NOT DETECTED
EC STX1+STX2 GENES STL QL NAA+NON-PROBE: NOT DETECTED
FERRITIN SERPL-MCNC: 8.66 NG/ML (ref 13–150)
G LAMBLIA DNA STL QL NAA+NON-PROBE: NOT DETECTED
IRON 24H UR-MRATE: 21 MCG/DL (ref 37–145)
IRON SATN MFR SERPL: 4 % (ref 20–50)
S ENT+BONG DNA STL QL NAA+NON-PROBE: NOT DETECTED
SHIGELLA SP+EIEC IPAH ST NAA+NON-PROBE: NOT DETECTED
TIBC SERPL-MCNC: 504 MCG/DL (ref 298–536)
TRANSFERRIN SERPL-MCNC: 338 MG/DL (ref 200–360)

## 2025-04-08 ENCOUNTER — LAB (OUTPATIENT)
Dept: LAB | Facility: HOSPITAL | Age: 25
End: 2025-04-08
Payer: COMMERCIAL

## 2025-04-08 DIAGNOSIS — D64.9 ANEMIA, UNSPECIFIED TYPE: ICD-10-CM

## 2025-04-08 LAB
HEMOCCULT STL QL IA: NEGATIVE

## 2025-04-08 PROCEDURE — 82274 ASSAY TEST FOR BLOOD FECAL: CPT

## 2025-04-23 ENCOUNTER — CONSULT (OUTPATIENT)
Dept: ONCOLOGY | Facility: HOSPITAL | Age: 25
End: 2025-04-23
Payer: COMMERCIAL

## 2025-04-23 ENCOUNTER — LAB (OUTPATIENT)
Dept: ONCOLOGY | Facility: HOSPITAL | Age: 25
End: 2025-04-23
Payer: COMMERCIAL

## 2025-04-23 VITALS
OXYGEN SATURATION: 99 % | RESPIRATION RATE: 18 BRPM | HEART RATE: 87 BPM | HEIGHT: 69 IN | DIASTOLIC BLOOD PRESSURE: 75 MMHG | TEMPERATURE: 98 F | BODY MASS INDEX: 22.84 KG/M2 | WEIGHT: 154.2 LBS | SYSTOLIC BLOOD PRESSURE: 121 MMHG

## 2025-04-23 DIAGNOSIS — D64.9 ANEMIA, UNSPECIFIED TYPE: Primary | ICD-10-CM

## 2025-04-23 DIAGNOSIS — D50.0 IRON DEFICIENCY ANEMIA DUE TO CHRONIC BLOOD LOSS: Primary | ICD-10-CM

## 2025-04-23 LAB
ANISOCYTOSIS BLD QL: NORMAL
BASOPHILS # BLD AUTO: 0.02 10*3/MM3 (ref 0–0.2)
BASOPHILS NFR BLD AUTO: 0.5 % (ref 0–1.5)
C3 FRG RBC-MCNC: NORMAL
DEPRECATED RDW RBC AUTO: 50.1 FL (ref 37–54)
EOSINOPHIL # BLD AUTO: 0.12 10*3/MM3 (ref 0–0.4)
EOSINOPHIL NFR BLD AUTO: 3.1 % (ref 0.3–6.2)
ERYTHROCYTE [DISTWIDTH] IN BLOOD BY AUTOMATED COUNT: 16.5 % (ref 12.3–15.4)
FERRITIN SERPL-MCNC: 17.28 NG/ML (ref 13–150)
FOLATE SERPL-MCNC: 11 NG/ML (ref 4.78–24.2)
HAPTOGLOB SERPL-MCNC: 129 MG/DL (ref 30–200)
HCT VFR BLD AUTO: 31.5 % (ref 34–46.6)
HGB BLD-MCNC: 9.5 G/DL (ref 12–15.9)
HYPOCHROMIA BLD QL: NORMAL
IMM GRANULOCYTES # BLD AUTO: 0.01 10*3/MM3 (ref 0–0.05)
IMM GRANULOCYTES NFR BLD AUTO: 0.3 % (ref 0–0.5)
IRON 24H UR-MRATE: 28 MCG/DL (ref 37–145)
IRON SATN MFR SERPL: 6 % (ref 20–50)
LDH SERPL-CCNC: 141 U/L (ref 135–214)
LYMPHOCYTES # BLD AUTO: 0.92 10*3/MM3 (ref 0.7–3.1)
LYMPHOCYTES NFR BLD AUTO: 23.9 % (ref 19.6–45.3)
MCH RBC QN AUTO: 25.1 PG (ref 26.6–33)
MCHC RBC AUTO-ENTMCNC: 30.2 G/DL (ref 31.5–35.7)
MCV RBC AUTO: 83.3 FL (ref 79–97)
MONOCYTES # BLD AUTO: 0.46 10*3/MM3 (ref 0.1–0.9)
MONOCYTES NFR BLD AUTO: 11.9 % (ref 5–12)
NEUTROPHILS NFR BLD AUTO: 2.32 10*3/MM3 (ref 1.7–7)
NEUTROPHILS NFR BLD AUTO: 60.3 % (ref 42.7–76)
NRBC BLD AUTO-RTO: 0 /100 WBC (ref 0–0.2)
PLAT MORPH BLD: NORMAL
PLATELET # BLD AUTO: 284 10*3/MM3 (ref 140–450)
PMV BLD AUTO: 10.7 FL (ref 6–12)
POIKILOCYTOSIS BLD QL SMEAR: NORMAL
RBC # BLD AUTO: 3.78 10*6/MM3 (ref 3.77–5.28)
RETICS # AUTO: 0.03 10*6/MM3 (ref 0.02–0.13)
RETICS/RBC NFR AUTO: 0.71 % (ref 0.7–1.9)
SCHISTOCYTES BLD QL SMEAR: NORMAL
TIBC SERPL-MCNC: 489 MCG/DL (ref 298–536)
TRANSFERRIN SERPL-MCNC: 328 MG/DL (ref 200–360)
VIT B12 BLD-MCNC: 477 PG/ML (ref 211–946)
WBC MORPH BLD: NORMAL
WBC NRBC COR # BLD AUTO: 3.85 10*3/MM3 (ref 3.4–10.8)

## 2025-04-23 PROCEDURE — 36415 COLL VENOUS BLD VENIPUNCTURE: CPT | Performed by: NURSE PRACTITIONER

## 2025-04-23 PROCEDURE — 82728 ASSAY OF FERRITIN: CPT | Performed by: NURSE PRACTITIONER

## 2025-04-23 PROCEDURE — 83540 ASSAY OF IRON: CPT | Performed by: NURSE PRACTITIONER

## 2025-04-23 PROCEDURE — 82746 ASSAY OF FOLIC ACID SERUM: CPT | Performed by: NURSE PRACTITIONER

## 2025-04-23 PROCEDURE — 85007 BL SMEAR W/DIFF WBC COUNT: CPT | Performed by: NURSE PRACTITIONER

## 2025-04-23 PROCEDURE — G0463 HOSPITAL OUTPT CLINIC VISIT: HCPCS | Performed by: NURSE PRACTITIONER

## 2025-04-23 PROCEDURE — 84466 ASSAY OF TRANSFERRIN: CPT | Performed by: NURSE PRACTITIONER

## 2025-04-23 PROCEDURE — 85045 AUTOMATED RETICULOCYTE COUNT: CPT | Performed by: NURSE PRACTITIONER

## 2025-04-23 PROCEDURE — 83615 LACTATE (LD) (LDH) ENZYME: CPT | Performed by: NURSE PRACTITIONER

## 2025-04-23 PROCEDURE — 83010 ASSAY OF HAPTOGLOBIN QUANT: CPT | Performed by: NURSE PRACTITIONER

## 2025-04-23 PROCEDURE — 85025 COMPLETE CBC W/AUTO DIFF WBC: CPT | Performed by: NURSE PRACTITIONER

## 2025-04-23 PROCEDURE — 82607 VITAMIN B-12: CPT | Performed by: NURSE PRACTITIONER

## 2025-04-23 NOTE — PROGRESS NOTES
Chief Complaint/Reason for Referral:  Establish Care and Anemia    Wendy Blanc, AP*  Wendy Blanc, SHANTE    Records Obtained:  Records of the patients history including those obtained from [facility] and patient information were reviewed and summarized in detail.    Subjective    History of Present Illness  The patient is a very pleasant 24-year-old  female who presents for iron deficiency anemia. Referral is placed by her PCP, SHANTE Clarke. She has a medical history of iron deficiency anemia, depression, insomnia, and a history of MRSA. Recently had a miscarriage at 8 weeks of pregnancy on 2/10/2025.  She is a former tobacco user but currently uses vape daily. She consumes approximately 2 alcoholic drinks per week. She has been taking ferrous gluconate 1 tablet daily with breakfast.    Iron supplementation has been ongoing for approximately 2 weeks, which is reportedly well-tolerated. Typically, heavy menstrual cycles are experienced, with the first 2 to 3 days being particularly heavy. However, the most recent cycle was notably lighter, lasting only 4 days compared to the usual 7 days. No history of iron infusions or blood transfusions is reported. Despite an improvement in fatigue, significant lightheadedness continues, particularly in the mornings. Headaches have been experienced over the past few days, including at the time of the visit. A follow-up appointment with her gynecologist is scheduled for next month. Previous prescriptions for iron were not adhered to.    A miscarriage occurred in 02/2025 at approximately 8 weeks gestation, which may have contributed to lower hemoglobin levels during that period.    SOCIAL HISTORY  The patient is a former tobacco user and currently uses vape every day. She drinks alcohol once a week, consuming about 3 drinks per occasion.    FAMILY HISTORY  Her maternal grandfather has lung cancer and was a smoker.    Results  Labs 3/31/2025:   WBC 3.62,  hemoglobin 8.3, platelet count of 259, MCV of 84. Diff normal.   2/11/2025: WBC 9.82, hemoglobin 7.2, MCV of 87, platelet count of 161,   2/11/2025: WBC 15.54, hemoglobin 10.3, Platelet count of 205.    - Chemistry Panel: 03/31/2025, Normal   - Thyroid Function Test: 03/31/2025, Normal   - Iron: 03/31/2025, 21   - Ferritin: 03/31/2025, 8.66   - Iron Saturation: 03/31/2025, 4%   - Total Iron Binding Capacity (TIBC): 03/31/2025, 504   - Hemoglobin: 03/31/2025, 8.3   - Mean Corpuscular Volume (MCV): 03/31/2025, 84   - Fecal Occult Blood Test: 04/08/2025, Negative x3         Oncology/Hematology History    No history exists.       Review of Systems   Constitutional:  Positive for fatigue.   HENT: Negative.     Eyes: Negative.    Respiratory: Negative.     Cardiovascular: Negative.    Gastrointestinal: Negative.    Endocrine: Negative.    Genitourinary:  Positive for vaginal bleeding.   Musculoskeletal:  Positive for back pain.   Skin: Negative.    Allergic/Immunologic: Negative.    Neurological: Negative.    Hematological: Negative.    Psychiatric/Behavioral: Negative.     All other systems reviewed and are negative.      Current Outpatient Medications on File Prior to Visit   Medication Sig Dispense Refill    escitalopram (LEXAPRO) 20 MG tablet Take 1 tablet by mouth Daily. 30 tablet 1    ferrous gluconate (FERGON) 324 MG tablet Take 1 tablet by mouth Daily With Breakfast. 90 tablet 0     No current facility-administered medications on file prior to visit.       No Known Allergies  Past Medical History:   Diagnosis Date    Anxiety     Depression     MRSA (methicillin resistant staph aureus) culture positive      Past Surgical History:   Procedure Laterality Date    LEG ABSCESS INCISION AND DRAINAGE Right     leg- mrsa     Social History     Socioeconomic History    Marital status: Single   Tobacco Use    Smoking status: Former     Current packs/day: 1.00     Average packs/day: 1 pack/day for 2.0 years (2.0 ttl pk-yrs)      Types: Cigarettes     Passive exposure: Past    Smokeless tobacco: Never   Vaping Use    Vaping status: Every Day    Substances: Nicotine   Substance and Sexual Activity    Alcohol use: Yes     Alcohol/week: 2.0 standard drinks of alcohol     Types: 2 Glasses of wine per week     Comment: social    Drug use: Never    Sexual activity: Defer     History reviewed. No pertinent family history.  Immunization History   Administered Date(s) Administered    31-influenza Vac Quardvalent Preservativ 10/31/2016    COVID-19 (PFIZER) Purple Cap Monovalent 04/07/2021, 05/04/2021    COVID-19 (UNSPECIFIED) 07/14/2021    DTaP 2000, 01/12/2001, 10/02/2001, 01/25/2002, 10/27/2006    DTaP / Hep B / IPV 09/23/2013    FluMist 2-49yrs (Nasal) 12/10/2010    HPV Quadrivalent 07/12/2016    Hep A, 2 Dose 12/10/2010, 09/22/2011    Hep B, Adolescent or Pediatric 2000, 01/25/2002, 09/12/2002    Hib (PRP-T) 2000, 01/12/2001, 10/02/2001, 01/25/2002    Hpv9 10/31/2016    IPV 2000, 01/12/2001, 10/02/2001, 10/27/2006    Influenza, Unspecified 10/19/2023    MCV4 Unspecified 10/31/2016    MMR 01/25/2002, 10/27/2006    Meningococcal Conjugate 09/22/2011, 10/31/2016    Pneumococcal Conjugate 13-Valent (PCV13) 2000, 01/12/2001, 10/05/2001    Td (TDVAX) 12/10/2010    Tdap 10/17/2024       Tobacco Use: Medium Risk (4/23/2025)    Patient History     Smoking Tobacco Use: Former     Smokeless Tobacco Use: Never     Passive Exposure: Past       Objective     Physical Exam  Vitals and nursing note reviewed.   Constitutional:       Appearance: Normal appearance.   HENT:      Head: Normocephalic.      Nose: Nose normal.   Eyes:      Extraocular Movements: Extraocular movements intact.   Cardiovascular:      Rate and Rhythm: Normal rate and regular rhythm.      Pulses: Normal pulses.      Heart sounds: Normal heart sounds.   Pulmonary:      Effort: Pulmonary effort is normal. No respiratory distress.      Breath sounds: Normal  "breath sounds.   Abdominal:      Palpations: Abdomen is soft.   Musculoskeletal:         General: Normal range of motion.      Cervical back: Normal range of motion and neck supple.   Skin:     General: Skin is warm and dry.      Capillary Refill: Capillary refill takes less than 2 seconds.   Neurological:      General: No focal deficit present.      Mental Status: She is alert and oriented to person, place, and time.   Psychiatric:         Mood and Affect: Mood normal.         Behavior: Behavior normal.         Thought Content: Thought content normal.         Judgment: Judgment normal.         Vitals:    04/23/25 1321   BP: 121/75   Pulse: 87   Resp: 18   Temp: 98 °F (36.7 °C)   TempSrc: Temporal   SpO2: 99%   Weight: 69.9 kg (154 lb 3.2 oz)   Height: 175.3 cm (69\")   PainSc: 0-No pain       Wt Readings from Last 3 Encounters:   04/23/25 69.9 kg (154 lb 3.2 oz)   03/31/25 69.4 kg (153 lb)   02/11/25 70 kg (154 lb 5.2 oz)        BMI is within normal parameters. No other follow-up for BMI required.       ECOG score: 0         ECOG: (0) Fully Active - Able to Carry On All Pre-disease Performance Without Restriction  Fall Risk Assessment was completed, and patient is at low risk for falls.  PHQ-9 Total Score:         The patient is  experiencing fatigue. Fatigue score: 1    PT/OT Functional Screening: PT fx screen : No needs identified  Speech Functional Screening: Speech fx screen : No needs identified  Rehab to be ordered: Rehab to be ordered : No needs identified        Result Review :  The following data was reviewed by: SHANTE Hood on 04/23/2025:  Lab Results   Component Value Date    HGB 8.3 (L) 03/31/2025    HCT 27.3 (L) 03/31/2025    MCV 84.0 03/31/2025     03/31/2025    WBC 3.62 03/31/2025    NEUTROABS 1.97 03/31/2025    LYMPHSABS 1.09 03/31/2025    MONOSABS 0.42 03/31/2025    EOSABS 0.10 03/31/2025    BASOSABS 0.03 03/31/2025     Lab Results   Component Value Date    GLUCOSE 83 " "03/31/2025    BUN 14 03/31/2025    CREATININE 0.66 03/31/2025     03/31/2025    K 4.0 03/31/2025     03/31/2025    CO2 23.9 03/31/2025    CALCIUM 9.5 03/31/2025    PROTEINTOT 6.9 03/31/2025    ALBUMIN 4.5 03/31/2025    BILITOT 0.2 03/31/2025    ALKPHOS 49 03/31/2025    AST 16 03/31/2025    ALT 8 03/31/2025     Lab Results   Component Value Date    Ferritin 8.66 (L) 03/31/2025    Folate 14.40 02/15/2024     Lab Results   Component Value Date    IRON 21 (L) 03/31/2025    LABIRON 4 (L) 03/31/2025    TRANSFERRIN 338 03/31/2025    TIBC 504 03/31/2025     Lab Results   Component Value Date    FERRITIN 8.66 (L) 03/31/2025    [id number] 1,359 (H) 02/15/2024    FOLATE 14.40 02/15/2024     No results found for: \"PSA\", \"CEA\", \"AFP\", \"\", \"\"         Assessment and Plan:  Diagnoses and all orders for this visit:    1. Iron deficiency anemia due to chronic blood loss (Primary)  -     Iron Profile  -     Ferritin  -     Vitamin B12  -     Folate  -     CBC & Differential  -     Reticulocytes  -     Lactate Dehydrogenase  -     Haptoglobin  -     CBC & Differential; Future  -     Iron Profile; Future  -     Ferritin; Future        Assessment & Plan  1. Iron deficiency anemia: likely secondary to recent miscarriage in February and history of heavy menses. Currently taking oral iron 1 tab QD.   Hemoglobin levels have shown improvement since the miscarriage in February, but symptoms of lightheadedness and dizziness persist, particularly in the morning and during work. Blood pressure readings are within normal range. Different types of IV iron products were discussed, including Feraheme, Injectafer, Ferrlecit, and Venofer, along with their respective administration protocols and potential side effects. Feraheme and Injectafer do not require premedications and are given a week apart. Ferrlecit requires premedications such as dexamethasone, Benadryl or Zyrtec, Tylenol, and an antihistamine like Pepcid, and may be " given in association with infusion-related reactions such as leg swelling and arthralgias. Venofer can be given daily or weekly, depending on the dose. Insurance coverage will determine the specific product used.     A recheck of lab work will be conducted today to assess improvement over the past 2 weeks, and folate and B12 levels will also be evaluated. Post-infusion, levels will be reassessed approximately 4 to 6 weeks later.    2. Miscarriage. February of 2025.   A miscarriage occurred in February at around 8 weeks, likely contributing to lower hemoglobin levels at that time. Reports the last cycle was not as heavy as previous cycles have been in the past. She has a follow up with GYN next month.     Follow-up  Follow-up is scheduled in 2 months with recheck in lab work.         I spent 30 minutes caring for Yuridia on this date of service. This time includes time spent by me in the following activities:preparing for the visit, reviewing tests, obtaining and/or reviewing a separately obtained history, performing a medically appropriate examination and/or evaluation , counseling and educating the patient/family/caregiver, ordering medications, tests, or procedures, referring and communicating with other health care professionals , documenting information in the medical record, and independently interpreting results and communicating that information with the patient/family/caregiver    Patient Follow Up: 2 months with NP and lab prior.     Patient was given instructions and counseling regarding her condition or for health maintenance advice. Please see specific information pulled into the AVS if appropriate.     SHANTE Hood    4/23/2025    .tob    Patient or patient representative verbalized consent for the use of Ambient Listening during the visit with  SHANTE Hood for chart documentation. 4/23/2025  13:52 EDT

## 2025-04-24 DIAGNOSIS — K90.49 MALABSORPTION DUE TO INTOLERANCE, NOT ELSEWHERE CLASSIFIED: Primary | ICD-10-CM

## 2025-04-24 PROBLEM — D50.9 IRON DEFICIENCY ANEMIA: Status: ACTIVE | Noted: 2025-04-24

## 2025-05-12 ENCOUNTER — TELEPHONE (OUTPATIENT)
Dept: ONCOLOGY | Facility: HOSPITAL | Age: 25
End: 2025-05-12
Payer: COMMERCIAL

## 2025-05-12 NOTE — TELEPHONE ENCOUNTER
Caller: Yuridia Nelson    Relationship: Self    Best call back number: [phone]    What is the best time to reach you: ANY    Who are you requesting to speak with (clinical staff, provider,  specific staff member): CLINICAL     What was the call regarding: KENZIE WAS IN ON 4-23  EVONNE MENTIONED THAT SHE MAY NEED AN IRON TRANSFUSION  YURIDIA IS CALLING TO FOLLOW UP

## 2025-05-13 DIAGNOSIS — D50.0 IRON DEFICIENCY ANEMIA DUE TO CHRONIC BLOOD LOSS: Primary | ICD-10-CM

## 2025-05-14 ENCOUNTER — OFFICE VISIT (OUTPATIENT)
Dept: FAMILY MEDICINE CLINIC | Facility: CLINIC | Age: 25
End: 2025-05-14
Payer: COMMERCIAL

## 2025-05-14 VITALS
WEIGHT: 153.4 LBS | OXYGEN SATURATION: 100 % | HEART RATE: 77 BPM | SYSTOLIC BLOOD PRESSURE: 120 MMHG | DIASTOLIC BLOOD PRESSURE: 73 MMHG | BODY MASS INDEX: 22.72 KG/M2 | HEIGHT: 69 IN

## 2025-05-14 DIAGNOSIS — F32.A ANXIETY AND DEPRESSION: ICD-10-CM

## 2025-05-14 DIAGNOSIS — F41.9 ANXIETY AND DEPRESSION: ICD-10-CM

## 2025-05-14 DIAGNOSIS — D50.9 IRON DEFICIENCY ANEMIA, UNSPECIFIED IRON DEFICIENCY ANEMIA TYPE: ICD-10-CM

## 2025-05-14 DIAGNOSIS — M54.42 ACUTE LEFT-SIDED LOW BACK PAIN WITH LEFT-SIDED SCIATICA: ICD-10-CM

## 2025-05-14 PROCEDURE — 99214 OFFICE O/P EST MOD 30 MIN: CPT | Performed by: NURSE PRACTITIONER

## 2025-05-14 RX ORDER — ESCITALOPRAM OXALATE 20 MG/1
20 TABLET ORAL DAILY
Qty: 90 TABLET | Refills: 1 | Status: SHIPPED | OUTPATIENT
Start: 2025-05-14

## 2025-05-14 RX ORDER — NAPROXEN 500 MG/1
500 TABLET ORAL 2 TIMES DAILY WITH MEALS
Qty: 30 TABLET | Refills: 0 | Status: SHIPPED | OUTPATIENT
Start: 2025-05-14

## 2025-05-14 RX ORDER — FERROUS GLUCONATE 324(38)MG
324 TABLET ORAL
Qty: 90 TABLET | Refills: 0 | Status: SHIPPED | OUTPATIENT
Start: 2025-05-14

## 2025-05-14 RX ORDER — METHOCARBAMOL 500 MG/1
500 TABLET, FILM COATED ORAL 2 TIMES DAILY PRN
Qty: 30 TABLET | Refills: 0 | Status: SHIPPED | OUTPATIENT
Start: 2025-05-14

## 2025-05-14 NOTE — PROGRESS NOTES
"Chief Complaint  Follow-up anxiety depression, back pain    SUBJECTIVE  Yuridia Nelson presents to [facility]   Patient presents today to follow-up on anxiety/depression, iron deficiency, and complaining of back pain.  Patient reports the Lexapro has been working very well, she can see significant improvement in symptoms, and reports that her diarrhea has completely resolved.    Patient also c/o LBP left side, onset 1 month.  States the pain runs down into her left buttocks.  States that it seems to be more bothersome after being up on her feet all day.  No known specific injury.      History of Present Illness  Past Medical History:   Diagnosis Date    Anxiety     Depression     MRSA (methicillin resistant staph aureus) culture positive       History reviewed. No pertinent family history.   Past Surgical History:   Procedure Laterality Date    LEG ABSCESS INCISION AND DRAINAGE Right     leg- mrsa        Current Outpatient Medications:     escitalopram (LEXAPRO) 20 MG tablet, Take 1 tablet by mouth Daily., Disp: 90 tablet, Rfl: 1    ferrous gluconate (FERGON) 324 MG tablet, Take 1 tablet by mouth Daily With Breakfast., Disp: 90 tablet, Rfl: 0    methocarbamol (ROBAXIN) 500 MG tablet, Take 1 tablet by mouth 2 (Two) Times a Day As Needed for Muscle Spasms., Disp: 30 tablet, Rfl: 0    naproxen (Naprosyn) 500 MG tablet, Take 1 tablet by mouth 2 (Two) Times a Day With Meals., Disp: 30 tablet, Rfl: 0    OBJECTIVE  Vital Signs:   /73 (BP Location: Left arm, Patient Position: Sitting, Cuff Size: Adult)   Pulse 77   Ht 175.3 cm (69\")   Wt 69.6 kg (153 lb 6.4 oz)   SpO2 100%   BMI 22.65 kg/m²    Estimated body mass index is 22.65 kg/m² as calculated from the following:    Height as of this encounter: 175.3 cm (69\").    Weight as of this encounter: 69.6 kg (153 lb 6.4 oz).     Wt Readings from Last 3 Encounters:   05/14/25 69.6 kg (153 lb 6.4 oz)   04/23/25 69.9 kg (154 lb 3.2 oz) "   03/31/25 69.4 kg (153 lb)     BP Readings from Last 3 Encounters:   05/14/25 120/73   04/23/25 121/75   03/31/25 131/78       Physical Exam  Vitals reviewed.   Constitutional:       Appearance: Normal appearance. She is well-developed.   HENT:      Head: Normocephalic and atraumatic.      Right Ear: External ear normal.      Left Ear: External ear normal.   Eyes:      Conjunctiva/sclera: Conjunctivae normal.      Pupils: Pupils are equal, round, and reactive to light.   Cardiovascular:      Rate and Rhythm: Normal rate and regular rhythm.      Heart sounds: No murmur heard.     No friction rub. No gallop.   Pulmonary:      Effort: Pulmonary effort is normal.      Breath sounds: Normal breath sounds. No wheezing or rhonchi.   Musculoskeletal:      Lumbar back: Tenderness present. No bony tenderness. Normal range of motion. Positive left straight leg raise test.   Skin:     General: Skin is warm and dry.   Neurological:      Mental Status: She is alert and oriented to person, place, and time.      Cranial Nerves: No cranial nerve deficit.   Psychiatric:         Mood and Affect: Mood and affect normal.         Behavior: Behavior normal.         Thought Content: Thought content normal.         Judgment: Judgment normal.          Result Review    CMP          3/31/2025    11:02   CMP   Glucose 83    BUN 14    Creatinine 0.66    EGFR 125.8    Sodium 138    Potassium 4.0    Chloride 104    Calcium 9.5    Total Protein 6.9    Albumin 4.5    Globulin 2.4    Total Bilirubin 0.2    Alkaline Phosphatase 49    AST (SGOT) 16    ALT (SGPT) 8    Albumin/Globulin Ratio 1.9    BUN/Creatinine Ratio 21.2    Anion Gap 10.1      CBC          2/11/2025    12:13 2/11/2025    16:49 3/31/2025    11:02 4/23/2025    14:13   CBC   WBC 15.54  9.82  3.62  3.85    RBC 3.47  2.47  3.25  3.78    Hemoglobin 10.3  7.2  8.3  9.5    Hematocrit 31.1  21.7  27.3  31.5    MCV 89.6  87.9  84.0  83.3    MCH 29.7  29.1  25.5  25.1    MCHC 33.1  33.2  30.4   30.2    RDW 12.4  12.2  14.2  16.5    Platelets 205  161  259  284        TSH          3/31/2025    11:02   TSH   TSH 2.090            US Ob Transvaginal  Result Date: 2/11/2025  Impression: 1. No evidence of normal intrauterine pregnancy. Sonographer reports soft tissue and blood products at the cervix which could represent active passage of a failed pregnancy. Correlate clinically with physical examination and follow-up with beta hCG. OB consultation may be helpful. 2. Probable left-sided corpus luteum cyst. Electronically Signed: Jose Armando Moralez  2/11/2025 2:03 PM EST  Workstation ID: [id number]       The above data has been reviewed by SHANTE Taylor 05/14/2025 11:47 EDT.          Patient Care Team:  Wendy Blanc APRN as PCP - General (Nurse Practitioner)    BMI is within normal parameters. No other follow-up for BMI required.       ASSESSMENT & PLAN    Diagnoses and all orders for this visit:    1. Acute left-sided low back pain with left-sided sciatica  Comments:  Side effects and administration of medication discussed  Orders:  -     Ambulatory Referral to Physical Therapy for Evaluation & Treatment  -     naproxen (Naprosyn) 500 MG tablet; Take 1 tablet by mouth 2 (Two) Times a Day With Meals.  Dispense: 30 tablet; Refill: 0  -     methocarbamol (ROBAXIN) 500 MG tablet; Take 1 tablet by mouth 2 (Two) Times a Day As Needed for Muscle Spasms.  Dispense: 30 tablet; Refill: 0    2. Anxiety and depression  Assessment & Plan:  Patient will continue current medication, symptoms much improved, we will follow-up in 3 months    Orders:  -     escitalopram (LEXAPRO) 20 MG tablet; Take 1 tablet by mouth Daily.  Dispense: 90 tablet; Refill: 1    3. Iron deficiency anemia, unspecified iron deficiency anemia type  -     ferrous gluconate (FERGON) 324 MG tablet; Take 1 tablet by mouth Daily With Breakfast.  Dispense: 90 tablet; Refill: 0         Tobacco Use: Medium Risk (5/14/2025)    Patient History      Smoking Tobacco Use: Former     Smokeless Tobacco Use: Never     Passive Exposure: Past       Follow Up     Return in about 6 months (around 11/14/2025), or if symptoms worsen or fail to improve.        Patient was given instructions and counseling regarding her condition or for health maintenance advice. Please see specific information pulled into the AVS if appropriate.   I have reviewed information obtained and documented by others and I have confirmed the accuracy of this documented note.    Wendy Blanc APRN

## 2025-05-15 NOTE — TELEPHONE ENCOUNTER
Left another voicemail for patient to get lab work done and that we need to move appointment up. I will also send a my chart message.

## 2025-05-16 ENCOUNTER — LAB (OUTPATIENT)
Dept: LAB | Facility: HOSPITAL | Age: 25
End: 2025-05-16
Payer: COMMERCIAL

## 2025-05-16 DIAGNOSIS — D50.0 IRON DEFICIENCY ANEMIA DUE TO CHRONIC BLOOD LOSS: ICD-10-CM

## 2025-05-16 LAB
BASOPHILS # BLD AUTO: 0.05 10*3/MM3 (ref 0–0.2)
BASOPHILS NFR BLD AUTO: 1 % (ref 0–1.5)
DEPRECATED RDW RBC AUTO: 53.8 FL (ref 37–54)
EOSINOPHIL # BLD AUTO: 0.11 10*3/MM3 (ref 0–0.4)
EOSINOPHIL NFR BLD AUTO: 2.2 % (ref 0.3–6.2)
ERYTHROCYTE [DISTWIDTH] IN BLOOD BY AUTOMATED COUNT: 17.8 % (ref 12.3–15.4)
FERRITIN SERPL-MCNC: 43.74 NG/ML (ref 13–150)
HCT VFR BLD AUTO: 34.3 % (ref 34–46.6)
HGB BLD-MCNC: 10.7 G/DL (ref 12–15.9)
IMM GRANULOCYTES # BLD AUTO: 0.01 10*3/MM3 (ref 0–0.05)
IMM GRANULOCYTES NFR BLD AUTO: 0.2 % (ref 0–0.5)
IRON 24H UR-MRATE: 109 MCG/DL (ref 37–145)
IRON SATN MFR SERPL: 25 % (ref 20–50)
LYMPHOCYTES # BLD AUTO: 1.15 10*3/MM3 (ref 0.7–3.1)
LYMPHOCYTES NFR BLD AUTO: 22.9 % (ref 19.6–45.3)
MCH RBC QN AUTO: 25.5 PG (ref 26.6–33)
MCHC RBC AUTO-ENTMCNC: 31.2 G/DL (ref 31.5–35.7)
MCV RBC AUTO: 81.9 FL (ref 79–97)
MONOCYTES # BLD AUTO: 0.58 10*3/MM3 (ref 0.1–0.9)
MONOCYTES NFR BLD AUTO: 11.6 % (ref 5–12)
NEUTROPHILS NFR BLD AUTO: 3.12 10*3/MM3 (ref 1.7–7)
NEUTROPHILS NFR BLD AUTO: 62.1 % (ref 42.7–76)
NRBC BLD AUTO-RTO: 0 /100 WBC (ref 0–0.2)
PLATELET # BLD AUTO: 281 10*3/MM3 (ref 140–450)
PMV BLD AUTO: 11.2 FL (ref 6–12)
RBC # BLD AUTO: 4.19 10*6/MM3 (ref 3.77–5.28)
TIBC SERPL-MCNC: 438 MCG/DL (ref 298–536)
TRANSFERRIN SERPL-MCNC: 294 MG/DL (ref 200–360)
WBC NRBC COR # BLD AUTO: 5.02 10*3/MM3 (ref 3.4–10.8)

## 2025-05-16 PROCEDURE — 84466 ASSAY OF TRANSFERRIN: CPT | Performed by: NURSE PRACTITIONER

## 2025-05-16 PROCEDURE — 85025 COMPLETE CBC W/AUTO DIFF WBC: CPT | Performed by: NURSE PRACTITIONER

## 2025-05-16 PROCEDURE — 83540 ASSAY OF IRON: CPT | Performed by: NURSE PRACTITIONER

## 2025-05-16 PROCEDURE — 82728 ASSAY OF FERRITIN: CPT | Performed by: NURSE PRACTITIONER

## 2025-05-16 PROCEDURE — 36415 COLL VENOUS BLD VENIPUNCTURE: CPT | Performed by: NURSE PRACTITIONER

## 2025-05-21 ENCOUNTER — OFFICE VISIT (OUTPATIENT)
Dept: ONCOLOGY | Facility: HOSPITAL | Age: 25
End: 2025-05-21
Payer: COMMERCIAL

## 2025-05-21 VITALS
SYSTOLIC BLOOD PRESSURE: 119 MMHG | DIASTOLIC BLOOD PRESSURE: 81 MMHG | HEIGHT: 69 IN | WEIGHT: 160.6 LBS | HEART RATE: 84 BPM | RESPIRATION RATE: 16 BRPM | OXYGEN SATURATION: 98 % | BODY MASS INDEX: 23.79 KG/M2

## 2025-05-21 DIAGNOSIS — K90.49 MALABSORPTION DUE TO INTOLERANCE, NOT ELSEWHERE CLASSIFIED: ICD-10-CM

## 2025-05-21 DIAGNOSIS — N92.0 MENORRHAGIA WITH REGULAR CYCLE: ICD-10-CM

## 2025-05-21 DIAGNOSIS — D50.0 IRON DEFICIENCY ANEMIA DUE TO CHRONIC BLOOD LOSS: Primary | ICD-10-CM

## 2025-05-21 PROCEDURE — G0463 HOSPITAL OUTPT CLINIC VISIT: HCPCS | Performed by: NURSE PRACTITIONER

## 2025-05-21 NOTE — PROGRESS NOTES
Chief Complaint/Reason for Referral:  history of DVT (Pt has family history of blood clots/stroke)    Wendy Blanc, AP*  Wendy Blanc, APRN    Records Obtained:  Records of the patients history including those obtained from [facility] and patient information were reviewed and summarized in detail.    Subjective    History of Present Illness  The patient is a very pleasant 24-year-old  female who presents for follow-up for iron deficiency anemia. She was seen initially on 04/23/2025. She recently had a miscarriage in 02/2025 at approximately 8 weeks gestation, and iron deficiency anemia was felt to be secondary to the recent blood loss from the miscarriage. She had been taking oral iron and reported dizziness and lightheadedness at her last visit. The insurance company did not approve the IV iron due to tolerating the oral iron. She is here for a recheck. Recent lab work shows that the hemoglobin is improving from 9.5 to 10.7. In February, hemoglobin was down to 7.2. A recent iron panel shows iron levels improving from 28 to 109. Ferritin has improved from 8.66 to 43. Iron saturation has increased from 4% to 25%.    She reports persistent fatigue, which she attributes to her ongoing menstrual cycle currently characterized by heavy bleeding over the past few days. She experienced a brief interruption in her iron supplementation due to running out of the medication but resumed it within a day or two [facility] She is currently on ferrous gluconate, which she tolerates well, although she notes an increase in constipation, a known side effect of the medication. She expresses concern about the potential impact of her heavy menstrual bleeding on her condition. She does not require any refills for her iron supplements at this time but requests a doctor's note.    Results  Labs   - Hemoglobin: 10.7, improved from 9.5   - Iron level: 109, improved from 28   - Ferritin: 43,  improved from 8.66   - Iron saturation: 25%, improved from 4%    Oncology/Hematology History    No history exists.       Review of Systems   Constitutional:  Positive for fatigue.   HENT: Negative.     Eyes: Negative.    Respiratory: Negative.     Cardiovascular: Negative.    Gastrointestinal: Negative.    Endocrine: Negative.    Genitourinary:  Positive for vaginal bleeding.   Musculoskeletal: Negative.    Skin: Negative.    Allergic/Immunologic: Negative.    Neurological:  Negative for dizziness.   Hematological: Negative.    Psychiatric/Behavioral: Negative.     All other systems reviewed and are negative.      Current Outpatient Medications on File Prior to Visit   Medication Sig Dispense Refill    escitalopram (LEXAPRO) 20 MG tablet Take 1 tablet by mouth Daily. 90 tablet 1    ferrous gluconate (FERGON) 324 MG tablet Take 1 tablet by mouth Daily With Breakfast. 90 tablet 0    methocarbamol (ROBAXIN) 500 MG tablet Take 1 tablet by mouth 2 (Two) Times a Day As Needed for Muscle Spasms. 30 tablet 0    naproxen (Naprosyn) 500 MG tablet Take 1 tablet by mouth 2 (Two) Times a Day With Meals. 30 tablet 0     No current facility-administered medications on file prior to visit.       No Known Allergies  Past Medical History:   Diagnosis Date    Anxiety     Depression     MRSA (methicillin resistant staph aureus) culture positive      Past Surgical History:   Procedure Laterality Date    LEG ABSCESS INCISION AND DRAINAGE Right     leg- mrsa     Social History     Socioeconomic History    Marital status: Single   Tobacco Use    Smoking status: Former     Current packs/day: 1.00     Average packs/day: 1 pack/day for 2.0 years (2.0 ttl pk-yrs)     Types: Cigarettes     Passive exposure: Past    Smokeless tobacco: Never   Vaping Use    Vaping status: Every Day    Substances: Nicotine   Substance and Sexual Activity    Alcohol use: Yes     Alcohol/week: 2.0 standard drinks of alcohol     Types: 2 Glasses of wine per week      Comment: social    Drug use: Never    Sexual activity: Defer     History reviewed. No pertinent family history.  Immunization History   Administered Date(s) Administered    31-influenza Vac Quardvalent Preservativ 10/31/2016    COVID-19 (PFIZER) Purple Cap Monovalent 04/07/2021, 05/04/2021    COVID-19 (UNSPECIFIED) 07/14/2021    DTaP 2000, 01/12/2001, 10/02/2001, 01/25/2002, 10/27/2006    DTaP / Hep B / IPV 09/23/2013    FluMist 2-49yrs (Nasal) 12/10/2010    HPV Quadrivalent 07/12/2016    Hep A, 2 Dose 12/10/2010, 09/22/2011    Hep B, Adolescent or Pediatric 2000, 01/25/2002, 09/12/2002    Hib (PRP-T) 2000, 01/12/2001, 10/02/2001, 01/25/2002    Hpv9 10/31/2016    IPV 2000, 01/12/2001, 10/02/2001, 10/27/2006    Influenza, Unspecified 10/19/2023    MCV4 Unspecified 10/31/2016    MMR 01/25/2002, 10/27/2006    Meningococcal Conjugate 09/22/2011, 10/31/2016    Pneumococcal Conjugate 13-Valent (PCV13) 2000, 01/12/2001, 10/05/2001    Td (TDVAX) 12/10/2010    Tdap 10/17/2024       Tobacco Use: Medium Risk (5/21/2025)    Patient History     Smoking Tobacco Use: Former     Smokeless Tobacco Use: Never     Passive Exposure: Past       Objective     Physical Exam  Vitals and nursing note reviewed.   Constitutional:       Appearance: Normal appearance.   HENT:      Mouth/Throat:      Mouth: Mucous membranes are moist.   Eyes:      Extraocular Movements: Extraocular movements intact.   Cardiovascular:      Rate and Rhythm: Normal rate.   Pulmonary:      Effort: Pulmonary effort is normal. No respiratory distress.   Musculoskeletal:         General: Normal range of motion.      Cervical back: Normal range of motion and neck supple.   Skin:     General: Skin is warm and dry.   Neurological:      General: No focal deficit present.      Mental Status: She is alert and oriented to person, place, and time.   Psychiatric:         Mood and Affect: Mood normal.         Behavior: Behavior normal.          "Thought Content: Thought content normal.         Judgment: Judgment normal.         Vitals:    05/21/25 0851   BP: 119/81   Pulse: 84   Resp: 16   TempSrc: Temporal   SpO2: 98%   Weight: 72.8 kg (160 lb 9.6 oz)   Height: 175.3 cm (69\")   PainSc: 0-No pain       Wt Readings from Last 3 Encounters:   05/21/25 72.8 kg (160 lb 9.6 oz)   05/14/25 69.6 kg (153 lb 6.4 oz)   04/23/25 69.9 kg (154 lb 3.2 oz)        BMI is within normal parameters. No other follow-up for BMI required.       ECOG score: 0         ECOG: (0) Fully Active - Able to Carry On All Pre-disease Performance Without Restriction  Fall Risk Assessment was completed, and patient is at low risk for falls.  PHQ-9 Total Score:         The patient is  experiencing fatigue. Fatigue score: 10    PT/OT Functional Screening: PT fx screen : No needs identified  Speech Functional Screening: Speech fx screen : No needs identified  Rehab to be ordered: Rehab to be ordered : No needs identified        Result Review :  The following data was reviewed by: SHANTE Hood on 05/21/2025:  Lab Results   Component Value Date    HGB 10.7 (L) 05/16/2025    HCT 34.3 05/16/2025    MCV 81.9 05/16/2025     05/16/2025    WBC 5.02 05/16/2025    NEUTROABS 3.12 05/16/2025    LYMPHSABS 1.15 05/16/2025    MONOSABS 0.58 05/16/2025    EOSABS 0.11 05/16/2025    BASOSABS 0.05 05/16/2025     Lab Results   Component Value Date    GLUCOSE 83 03/31/2025    BUN 14 03/31/2025    CREATININE 0.66 03/31/2025     03/31/2025    K 4.0 03/31/2025     03/31/2025    CO2 23.9 03/31/2025    CALCIUM 9.5 03/31/2025    PROTEINTOT 6.9 03/31/2025    ALBUMIN 4.5 03/31/2025    BILITOT 0.2 03/31/2025    ALKPHOS 49 03/31/2025    AST 16 03/31/2025    ALT 8 03/31/2025     Lab Results   Component Value Date     04/23/2025    Ferritin 43.74 05/16/2025    Folate 11.00 04/23/2025     Lab Results   Component Value Date    IRON 109 05/16/2025    LABIRON 25 05/16/2025    TRANSFERRIN 294 " "05/16/2025    TIBC 438 05/16/2025     Lab Results   Component Value Date     04/23/2025    FERRITIN 43.74 05/16/2025    [id number] 477 04/23/2025    FOLATE 11.00 04/23/2025     No results found for: \"PSA\", \"CEA\", \"AFP\", \"\", \"\"         Assessment and Plan:  Diagnoses and all orders for this visit:    1. Iron deficiency anemia due to chronic blood loss (Primary)  -     Iron Profile; Future  -     Ferritin; Future  -     CBC & Differential; Future    2. Malabsorption due to intolerance, not elsewhere classified  -     Iron Profile; Future  -     Ferritin; Future  -     CBC & Differential; Future    3. Menorrhagia with regular cycle        Assessment & Plan  1. Iron deficiency anemia: felt to be secondary to recent miscarriage in February of 2025.     Iron studies have shown significant improvement, with hemoglobin levels increasing from 9.5 to 10.7 and iron levels rising from 28 to 109. Ferritin has also improved from 8.66 to 43, and iron saturation has increased from 4% to 25%. Despite these improvements, fatigue persists, likely due to the recent miscarriage and heavy menstrual bleeding. Continued administration of ferrous gluconate is recommended until hemoglobin levels normalize. To manage constipation, senna can be used as a stool softener, starting with one tablet and increasing to two if necessary. Once bowel movements are regular, the frequency of senna intake can be reduced to every other day. Iron levels will be re-evaluated in approximately 4 to 6 months.      2. Heavy menses    Follow-up  Re-evaluation of iron panel, ferritin, CBC in 6 months and follow up with NP.         I spent 20 minutes caring for Yuridia on this date of service. This time includes time spent by me in the following activities:preparing for the visit, reviewing tests, performing a medically appropriate examination and/or evaluation , counseling and educating the patient/family/caregiver, ordering medications, tests, or " procedures, referring and communicating with other health care professionals , documenting information in the medical record, and independently interpreting results and communicating that information with the patient/family/caregiver    Patient Follow Up: 6 months with NP.     Patient was given instructions and counseling regarding her condition or for health maintenance advice. Please see specific information pulled into the AVS if appropriate.     SHANTE Hood    5/21/2025    .tob    Patient or patient representative verbalized consent for the use of Ambient Listening during the visit with  SHANTE Hood for chart documentation. 5/21/2025  09:16 EDT

## 2025-08-15 DIAGNOSIS — D50.9 IRON DEFICIENCY ANEMIA, UNSPECIFIED IRON DEFICIENCY ANEMIA TYPE: ICD-10-CM

## 2025-08-18 RX ORDER — FERROUS GLUCONATE 324(38)MG
324 TABLET ORAL
Qty: 90 TABLET | Refills: 0 | Status: SHIPPED | OUTPATIENT
Start: 2025-08-18